# Patient Record
Sex: MALE | Race: OTHER | Employment: FULL TIME | URBAN - METROPOLITAN AREA
[De-identification: names, ages, dates, MRNs, and addresses within clinical notes are randomized per-mention and may not be internally consistent; named-entity substitution may affect disease eponyms.]

---

## 2017-01-17 ENCOUNTER — TELEPHONE (OUTPATIENT)
Dept: FAMILY MEDICINE CLINIC | Facility: CLINIC | Age: 32
End: 2017-01-17

## 2017-01-17 DIAGNOSIS — J30.2 SEASONAL ALLERGIC RHINITIS, UNSPECIFIED ALLERGIC RHINITIS TRIGGER: Primary | ICD-10-CM

## 2017-01-17 RX ORDER — CETIRIZINE HYDROCHLORIDE 10 MG/1
10 TABLET ORAL DAILY
Qty: 30 TABLET | Refills: 2 | Status: SHIPPED | OUTPATIENT
Start: 2017-01-17 | End: 2017-07-13

## 2017-07-13 ENCOUNTER — OFFICE VISIT (OUTPATIENT)
Dept: FAMILY MEDICINE CLINIC | Facility: CLINIC | Age: 32
End: 2017-07-13

## 2017-07-13 VITALS
DIASTOLIC BLOOD PRESSURE: 84 MMHG | HEART RATE: 67 BPM | OXYGEN SATURATION: 100 % | SYSTOLIC BLOOD PRESSURE: 150 MMHG | HEIGHT: 74 IN

## 2017-07-13 DIAGNOSIS — G56.01 CARPAL TUNNEL SYNDROME OF RIGHT WRIST: Primary | ICD-10-CM

## 2017-07-13 DIAGNOSIS — R03.0 ELEVATED BP WITHOUT DIAGNOSIS OF HYPERTENSION: ICD-10-CM

## 2017-07-13 DIAGNOSIS — J30.2 SEASONAL ALLERGIC RHINITIS, UNSPECIFIED ALLERGIC RHINITIS TRIGGER: ICD-10-CM

## 2017-07-13 DIAGNOSIS — M54.50 ACUTE RIGHT-SIDED LOW BACK PAIN WITHOUT SCIATICA: ICD-10-CM

## 2017-07-13 DIAGNOSIS — Z00.00 ROUTINE GENERAL MEDICAL EXAMINATION AT A HEALTH CARE FACILITY: ICD-10-CM

## 2017-07-13 PROCEDURE — 99395 PREV VISIT EST AGE 18-39: CPT | Performed by: FAMILY MEDICINE

## 2017-07-13 RX ORDER — ALBUTEROL SULFATE 90 UG/1
1-2 AEROSOL, METERED RESPIRATORY (INHALATION) EVERY 6 HOURS PRN
Qty: 1 INHALER | Refills: 5 | Status: SHIPPED | OUTPATIENT
Start: 2017-07-13 | End: 2019-09-19

## 2017-07-13 RX ORDER — METHYLPREDNISOLONE 4 MG/1
TABLET ORAL
Qty: 1 KIT | Refills: 1 | Status: SHIPPED | OUTPATIENT
Start: 2017-07-13 | End: 2019-09-19 | Stop reason: ALTCHOICE

## 2017-07-13 RX ORDER — CETIRIZINE HYDROCHLORIDE 10 MG/1
10 TABLET ORAL DAILY
Qty: 30 TABLET | Refills: 2 | Status: SHIPPED | OUTPATIENT
Start: 2017-07-13 | End: 2017-10-07

## 2017-07-13 NOTE — PROGRESS NOTES
CC: Annual Physical Exam    HPI:   lFaco Woodson is a 29year old male who presents for a complete physical exam and pain r hand    Wt Readings from Last 6 Encounters:  10/19/16 : (!) 341 lb  10/12/16 : (!) 360 lb    There is no height or weight on file to 32.0 pg   MCHC 34.3 32.0 - 37.0 g/dl   RDW 12.9 11.0 - 15.0 %    140 - 400 K/UL   MPV 8.1 7.4 - 10.3 fL   Neutrophil % 82 %   Lymphocyte % 12 %   Monocyte % 6 %   Eosinophil % 0 %   Basophil % 0 %   Neutrophil Absolute 4.6 1.8 - 7.7 K/UL   Lymphocyt paralysis, ataxia, numbness or tingling in the extremities,change in bowel or bladder control. HEMATOLOGIC:  Denies anemia, bleeding or bruising. LYMPHATICS:  Denies enlarged nodes or history of splenectomy. PSYCHIATRIC:  Denies depression or anxiety. maintenance, will check: No orders of the defined types were placed in this encounter. 1. Routine general medical examination at a health care facility  Labs in october    2.  Seasonal allergic rhinitis, unspecified allergic rhinitis trigger  Refill me

## 2017-10-07 DIAGNOSIS — J30.2 SEASONAL ALLERGIC RHINITIS: ICD-10-CM

## 2017-10-09 RX ORDER — CETIRIZINE HYDROCHLORIDE 10 MG/1
TABLET ORAL
Qty: 30 TABLET | Refills: 1 | Status: SHIPPED | OUTPATIENT
Start: 2017-10-09

## 2018-03-26 ENCOUNTER — HOSPITAL ENCOUNTER (EMERGENCY)
Facility: HOSPITAL | Age: 33
Discharge: HOME | End: 2018-03-26
Attending: EMERGENCY MEDICINE
Payer: MEDICAID

## 2018-03-26 ENCOUNTER — APPOINTMENT (OUTPATIENT)
Dept: GENERAL RADIOLOGY | Facility: HOSPITAL | Age: 33
End: 2018-03-26
Payer: MEDICAID

## 2018-03-26 VITALS
HEIGHT: 74 IN | HEART RATE: 72 BPM | WEIGHT: 315 LBS | BODY MASS INDEX: 40.43 KG/M2 | DIASTOLIC BLOOD PRESSURE: 70 MMHG | OXYGEN SATURATION: 98 % | TEMPERATURE: 98 F | RESPIRATION RATE: 18 BRPM | SYSTOLIC BLOOD PRESSURE: 136 MMHG

## 2018-03-26 DIAGNOSIS — J06.9 ACUTE UPPER RESPIRATORY INFECTION: Primary | ICD-10-CM

## 2018-03-26 DIAGNOSIS — J40 BRONCHITIS: ICD-10-CM

## 2018-03-26 LAB — S PYO AG THROAT QL: NEGATIVE

## 2018-03-26 PROCEDURE — 71046 X-RAY EXAM CHEST 2 VIEWS: CPT | Performed by: EMERGENCY MEDICINE

## 2018-03-26 PROCEDURE — 87430 STREP A AG IA: CPT

## 2018-03-26 PROCEDURE — 99283 EMERGENCY DEPT VISIT LOW MDM: CPT

## 2018-03-26 RX ORDER — PSEUDOEPHEDRINE HCL 120 MG/1
120 TABLET, FILM COATED, EXTENDED RELEASE ORAL EVERY 12 HOURS PRN
Qty: 10 TABLET | Refills: 0 | Status: SHIPPED | OUTPATIENT
Start: 2018-03-26 | End: 2018-04-25

## 2018-03-26 RX ORDER — ALBUTEROL SULFATE 90 UG/1
2 AEROSOL, METERED RESPIRATORY (INHALATION) EVERY 4 HOURS PRN
Qty: 1 INHALER | Refills: 0 | Status: SHIPPED | OUTPATIENT
Start: 2018-03-26 | End: 2018-04-25

## 2018-03-26 RX ORDER — PREDNISONE 20 MG/1
40 TABLET ORAL DAILY
Qty: 10 TABLET | Refills: 0 | Status: SHIPPED | OUTPATIENT
Start: 2018-03-26 | End: 2018-03-31

## 2018-03-27 NOTE — ED PROVIDER NOTES
Patient Seen in: [facility] AND [facility]    History   Patient presents with:  Cough/URI    Stated Complaint: sore throat, cough, fatigue    HPI    33-year-old male with history of asthma presents for complaint of cough, congestion, sore th Wt (!) 158.8 kg   SpO2 98%   BMI 44.94 kg/m²         Physical Exam   Constitutional: He is oriented to person, place, and time. He appears well-developed and well-nourished. HENT:   Head: Normocephalic and atraumatic.    Right Ear: Tympanic membrane blanka FINDINGS: CARDIAC/VASC: No cardiac silhouette abnormality or cardiomegaly. Unremarkable pulmonary vasculature. MEDIAST/DELROY: No visible mass or adenopathy. LUNGS/PLEURA: No significant pulmonary parenchymal abnormalities.   No effusion or pleural thicke

## 2019-09-09 ENCOUNTER — OFFICE VISIT (OUTPATIENT)
Dept: FAMILY MEDICINE CLINIC | Facility: CLINIC | Age: 34
End: 2019-09-09
Payer: MEDICAID

## 2019-09-09 ENCOUNTER — HOSPITAL ENCOUNTER (OUTPATIENT)
Dept: GENERAL RADIOLOGY | Facility: HOSPITAL | Age: 34
Discharge: HOME | End: 2019-09-09
Attending: FAMILY MEDICINE
Payer: MEDICAID

## 2019-09-09 ENCOUNTER — LAB ENCOUNTER (OUTPATIENT)
Dept: LAB | Facility: HOSPITAL | Age: 34
End: 2019-09-09
Attending: FAMILY MEDICINE
Payer: MEDICAID

## 2019-09-09 VITALS
SYSTOLIC BLOOD PRESSURE: 128 MMHG | TEMPERATURE: 98 F | BODY MASS INDEX: 40.43 KG/M2 | WEIGHT: 315 LBS | HEART RATE: 99 BPM | HEIGHT: 74 IN | DIASTOLIC BLOOD PRESSURE: 83 MMHG

## 2019-09-09 DIAGNOSIS — R19.7 DIARRHEA, UNSPECIFIED TYPE: ICD-10-CM

## 2019-09-09 DIAGNOSIS — E66.01 MORBID OBESITY WITH BMI OF 45.0-49.9, ADULT (HCC): ICD-10-CM

## 2019-09-09 DIAGNOSIS — R10.84 GENERALIZED ABDOMINAL PAIN: Primary | ICD-10-CM

## 2019-09-09 DIAGNOSIS — R10.84 GENERALIZED ABDOMINAL PAIN: ICD-10-CM

## 2019-09-09 LAB
ALBUMIN SERPL-MCNC: 4.2 G/DL (ref 3.4–5)
ALBUMIN/GLOB SERPL: 1 {RATIO} (ref 1–2)
ALP LIVER SERPL-CCNC: 76 U/L (ref 45–117)
ALT SERPL-CCNC: 28 U/L (ref 16–61)
ANION GAP SERPL CALC-SCNC: 5 MMOL/L (ref 0–18)
AST SERPL-CCNC: 24 U/L (ref 15–37)
BASOPHILS # BLD AUTO: 0.03 X10(3) UL (ref 0–0.2)
BASOPHILS NFR BLD AUTO: 0.4 %
BILIRUB SERPL-MCNC: 0.5 MG/DL (ref 0.1–2)
BUN BLD-MCNC: 10 MG/DL (ref 7–18)
BUN/CREAT SERPL: 9.5 (ref 10–20)
CALCIUM BLD-MCNC: 9.5 MG/DL (ref 8.5–10.1)
CHLORIDE SERPL-SCNC: 105 MMOL/L (ref 98–112)
CO2 SERPL-SCNC: 28 MMOL/L (ref 21–32)
CREAT BLD-MCNC: 1.05 MG/DL (ref 0.7–1.3)
DEPRECATED RDW RBC AUTO: 38.7 FL (ref 35.1–46.3)
EOSINOPHIL # BLD AUTO: 0.17 X10(3) UL (ref 0–0.7)
EOSINOPHIL NFR BLD AUTO: 2.1 %
ERYTHROCYTE [DISTWIDTH] IN BLOOD BY AUTOMATED COUNT: 12.1 % (ref 11–15)
GLOBULIN PLAS-MCNC: 4.1 G/DL (ref 2.8–4.4)
GLUCOSE BLD-MCNC: 96 MG/DL (ref 70–99)
HCT VFR BLD AUTO: 45.3 % (ref 39–53)
HGB BLD-MCNC: 14.9 G/DL (ref 13–17.5)
IMM GRANULOCYTES # BLD AUTO: 0.03 X10(3) UL (ref 0–1)
IMM GRANULOCYTES NFR BLD: 0.4 %
LYMPHOCYTES # BLD AUTO: 2.7 X10(3) UL (ref 1–4)
LYMPHOCYTES NFR BLD AUTO: 33.8 %
M PROTEIN MFR SERPL ELPH: 8.3 G/DL (ref 6.4–8.2)
MCH RBC QN AUTO: 28.7 PG (ref 26–34)
MCHC RBC AUTO-ENTMCNC: 32.9 G/DL (ref 31–37)
MCV RBC AUTO: 87.1 FL (ref 80–100)
MONOCYTES # BLD AUTO: 0.86 X10(3) UL (ref 0.1–1)
MONOCYTES NFR BLD AUTO: 10.8 %
NEUTROPHILS # BLD AUTO: 4.19 X10 (3) UL (ref 1.5–7.7)
NEUTROPHILS # BLD AUTO: 4.19 X10(3) UL (ref 1.5–7.7)
NEUTROPHILS NFR BLD AUTO: 52.5 %
OSMOLALITY SERPL CALC.SUM OF ELEC: 285 MOSM/KG (ref 275–295)
PATIENT FASTING: YES
PLATELET # BLD AUTO: 286 10(3)UL (ref 150–450)
POTASSIUM SERPL-SCNC: 4.5 MMOL/L (ref 3.5–5.1)
RBC # BLD AUTO: 5.2 X10(6)UL (ref 4.3–5.7)
SODIUM SERPL-SCNC: 138 MMOL/L (ref 136–145)
WBC # BLD AUTO: 8 X10(3) UL (ref 4–11)

## 2019-09-09 PROCEDURE — 99203 OFFICE O/P NEW LOW 30 MIN: CPT | Performed by: FAMILY MEDICINE

## 2019-09-09 PROCEDURE — 80053 COMPREHEN METABOLIC PANEL: CPT

## 2019-09-09 PROCEDURE — 74018 RADEX ABDOMEN 1 VIEW: CPT | Performed by: FAMILY MEDICINE

## 2019-09-09 PROCEDURE — 85025 COMPLETE CBC W/AUTO DIFF WBC: CPT

## 2019-09-09 PROCEDURE — 36415 COLL VENOUS BLD VENIPUNCTURE: CPT

## 2019-09-09 NOTE — PATIENT INSTRUCTIONS
Tratamiento de la diarrea    La diarrea consiste en evacuaciones más frecuentes o más flojas de lo usual. Es un problema común que puede tener distintas causas y, por lo general, se kelsey solo en pocos días.  Sin embargo, algunos casos pueden necesitar tra · Síntomas de deshidratación (mareos, sequedad en la boca y la [place], pulso acelerado, Leilani Haddad)  Date Last Reviewed: 7/1/2016  © 8506-3459 [employer]. [address] [facility], [address] [place]. Todos los derechos reservados.  Esta infor

## 2019-09-09 NOTE — PROGRESS NOTES
HPI:    Patient ID: Memo Ocampo is a 30year old male.     HPI  Patient presents with:  Abdominal Pain: started Sunday ate 3 tacos around 2:00 and then a Hamburger from [address]   [address] around 6:00 having diarrhea since   Medication Request  Medication Request: breath sounds normal.   Abdominal: Soft. Bowel sounds are normal. He exhibits no mass. There is generalized tenderness. There is no rebound, no guarding and no CVA tenderness.    Generalized tenderness especially in the right to mid epigastric region as wel

## 2019-09-19 ENCOUNTER — OFFICE VISIT (OUTPATIENT)
Dept: FAMILY MEDICINE CLINIC | Facility: CLINIC | Age: 34
End: 2019-09-19
Payer: MEDICAID

## 2019-09-19 VITALS
WEIGHT: 315 LBS | SYSTOLIC BLOOD PRESSURE: 134 MMHG | DIASTOLIC BLOOD PRESSURE: 87 MMHG | TEMPERATURE: 98 F | BODY MASS INDEX: 47 KG/M2 | HEART RATE: 77 BPM

## 2019-09-19 DIAGNOSIS — J30.2 SEASONAL ALLERGIC RHINITIS: ICD-10-CM

## 2019-09-19 DIAGNOSIS — K52.9 GASTROENTERITIS: Primary | ICD-10-CM

## 2019-09-19 DIAGNOSIS — R19.7 DIARRHEA, UNSPECIFIED TYPE: ICD-10-CM

## 2019-09-19 PROCEDURE — 99213 OFFICE O/P EST LOW 20 MIN: CPT | Performed by: FAMILY MEDICINE

## 2019-09-19 RX ORDER — ALBUTEROL SULFATE 90 UG/1
1-2 AEROSOL, METERED RESPIRATORY (INHALATION) EVERY 6 HOURS PRN
Qty: 1 INHALER | Refills: 0 | Status: SHIPPED | OUTPATIENT
Start: 2019-09-19 | End: 2020-03-16

## 2019-09-19 RX ORDER — OMEPRAZOLE 40 MG/1
40 CAPSULE, DELAYED RELEASE ORAL DAILY
Qty: 14 CAPSULE | Refills: 0 | Status: SHIPPED | OUTPATIENT
Start: 2019-09-19 | End: 2020-06-03

## 2019-09-19 RX ORDER — DICYCLOMINE HYDROCHLORIDE 10 MG/1
10 CAPSULE ORAL 3 TIMES DAILY
Qty: 21 CAPSULE | Refills: 0 | Status: SHIPPED | OUTPATIENT
Start: 2019-09-19 | End: 2019-09-29

## 2019-09-19 NOTE — PROGRESS NOTES
HPI:    Patient ID: Siomara Gonsalez is a 30year old male. HPI  Patient presents with:  Diarrhea: c/o diarrhea for 2 weeks, pt saw DR. Pisano last week and still not feeling better  more control of the diarrhea but not completely resolved.   One and half wee

## 2020-03-16 DIAGNOSIS — J30.2 SEASONAL ALLERGIC RHINITIS: ICD-10-CM

## 2020-03-16 RX ORDER — ALBUTEROL SULFATE 90 UG/1
1-2 AEROSOL, METERED RESPIRATORY (INHALATION) EVERY 6 HOURS PRN
Qty: 18 G | Refills: 0 | Status: SHIPPED | OUTPATIENT
Start: 2020-03-16 | End: 2020-11-30

## 2020-03-16 NOTE — TELEPHONE ENCOUNTER
Refill noted. Chart reviewed. Okay to fill times one with no additional refills. Refilled on behalf of Dr. Luis Daniel Hunter.

## 2020-03-27 ENCOUNTER — NURSE TRIAGE (OUTPATIENT)
Dept: FAMILY MEDICINE CLINIC | Facility: CLINIC | Age: 35
End: 2020-03-27

## 2020-03-27 DIAGNOSIS — J01.00 ACUTE NON-RECURRENT MAXILLARY SINUSITIS: ICD-10-CM

## 2020-03-27 PROCEDURE — 99212 OFFICE O/P EST SF 10 MIN: CPT | Performed by: FAMILY MEDICINE

## 2020-03-27 NOTE — TELEPHONE ENCOUNTER
Action Requested: Summary for Provider     []  Critical Lab, Recommendations Needed  [] Need Additional Advice  []   FYI    []   Need Orders  [] Need Medications Sent to Pharmacy  []  Other     SUMMARY:  Patient states for two days having a cough, runny no

## 2020-03-27 NOTE — TELEPHONE ENCOUNTER
TELEPHONE VISIT PROGRESS NOTE  Todays date: 3/27/2020 2:22 PM      Most recent Nurse Triage message/ Mychart message from patient:     C/o cough and cold symptoms for two days.      Due to the COVID-19 emergency implementation plan, this patient's incoming health and safety measures including washing hands, social distancing, covering mouth when coughing/ sneezing, avoid social meetings/ gatherings in face of this Covid 19 pandemic.     Patient verbalized understanding of plan and all questions answered to th

## 2020-05-13 ENCOUNTER — OFFICE VISIT (OUTPATIENT)
Dept: FAMILY MEDICINE CLINIC | Facility: CLINIC | Age: 35
End: 2020-05-13
Payer: MEDICAID

## 2020-05-13 VITALS
RESPIRATION RATE: 18 BRPM | SYSTOLIC BLOOD PRESSURE: 136 MMHG | TEMPERATURE: 98 F | DIASTOLIC BLOOD PRESSURE: 85 MMHG | HEIGHT: 74 IN | HEART RATE: 82 BPM | WEIGHT: 271 LBS | BODY MASS INDEX: 34.78 KG/M2

## 2020-05-13 DIAGNOSIS — M54.50 DORSALGIA OF LUMBAR REGION: ICD-10-CM

## 2020-05-13 PROCEDURE — 99213 OFFICE O/P EST LOW 20 MIN: CPT | Performed by: FAMILY MEDICINE

## 2020-05-13 RX ORDER — HYDROCODONE BITARTRATE AND ACETAMINOPHEN 10; 325 MG/1; MG/1
1 TABLET ORAL EVERY 6 HOURS PRN
Qty: 45 TABLET | Refills: 0 | Status: SHIPPED | OUTPATIENT
Start: 2020-05-13 | End: 2020-06-01

## 2020-05-13 NOTE — PROGRESS NOTES
HPI:    Patient ID: Gino Trujillo is a 30year old male. Pt presents with pain of his lower back after lifting bricks at work at noon today. Pt states he has pain trying to bend and hard to sit. Pain radiates to lower back and upper legs.  No numbness or encounter. Meds This Visit:  Requested Prescriptions     Signed Prescriptions Disp Refills   • HYDROcodone-acetaminophen (NORCO)  MG Oral Tab 45 tablet 0     Sig: Take 1 tablet by mouth every 6 (six) hours as needed for Pain.  Medication may caus

## 2020-05-21 ENCOUNTER — OFFICE VISIT (OUTPATIENT)
Dept: FAMILY MEDICINE CLINIC | Facility: CLINIC | Age: 35
End: 2020-05-21
Payer: MEDICAID

## 2020-05-21 VITALS
RESPIRATION RATE: 18 BRPM | DIASTOLIC BLOOD PRESSURE: 90 MMHG | HEART RATE: 94 BPM | TEMPERATURE: 99 F | WEIGHT: 315 LBS | HEIGHT: 74 IN | BODY MASS INDEX: 40.43 KG/M2 | SYSTOLIC BLOOD PRESSURE: 141 MMHG

## 2020-05-21 DIAGNOSIS — S39.012D STRAIN OF LUMBAR REGION, SUBSEQUENT ENCOUNTER: ICD-10-CM

## 2020-05-21 PROCEDURE — 3008F BODY MASS INDEX DOCD: CPT | Performed by: FAMILY MEDICINE

## 2020-05-21 PROCEDURE — 3080F DIAST BP >= 90 MM HG: CPT | Performed by: FAMILY MEDICINE

## 2020-05-21 PROCEDURE — 99213 OFFICE O/P EST LOW 20 MIN: CPT | Performed by: FAMILY MEDICINE

## 2020-05-21 PROCEDURE — 3077F SYST BP >= 140 MM HG: CPT | Performed by: FAMILY MEDICINE

## 2020-05-21 RX ORDER — CYCLOBENZAPRINE HCL 10 MG
10 TABLET ORAL NIGHTLY
Qty: 15 TABLET | Refills: 0 | Status: SHIPPED | OUTPATIENT
Start: 2020-05-21 | End: 2020-07-14

## 2020-05-21 NOTE — PROGRESS NOTES
HPI:    Patient ID: Frederic Aceves is a 30year old male. Pt presents for follow up for his back today. Pt states he now has sig tightness of his lower back. Pain is better with norco but ptt had had some tingling of legs/ buttocks now.       Review of Sy provided        No orders of the defined types were placed in this encounter.       Meds This Visit:  Requested Prescriptions     Signed Prescriptions Disp Refills   • cyclobenzaprine 10 MG Oral Tab 15 tablet 0     Sig: Take 1 tablet (10 mg total) by mouth

## 2020-06-01 ENCOUNTER — TELEPHONE (OUTPATIENT)
Dept: FAMILY MEDICINE CLINIC | Facility: CLINIC | Age: 35
End: 2020-06-01

## 2020-06-01 RX ORDER — HYDROCODONE BITARTRATE AND ACETAMINOPHEN 10; 325 MG/1; MG/1
1 TABLET ORAL EVERY 6 HOURS PRN
Qty: 45 TABLET | Refills: 0 | Status: SHIPPED | OUTPATIENT
Start: 2020-06-01 | End: 2020-07-14

## 2020-06-01 NOTE — TELEPHONE ENCOUNTER
Per patient he needs refill on his HYDROcodone-acetaminophen [facility]) per patient he is out of medication.     Current Outpatient Medications   Medication Sig Dispense Refill   •   15 tablet 0   • HYDROcodone-acetaminophen (NORCO)  MG Oral Tab Take 1 t

## 2020-06-01 NOTE — TELEPHONE ENCOUNTER
Message noted: Chart reviewed and may refill norco as requested times one. Script sent to listed pharmacy by secure method. [place]. No concerns noted. Please notify patient.

## 2020-06-03 ENCOUNTER — HOSPITAL ENCOUNTER (OUTPATIENT)
Dept: GENERAL RADIOLOGY | Facility: HOSPITAL | Age: 35
Discharge: HOME | End: 2020-06-03
Attending: PHYSICAL MEDICINE & REHABILITATION
Payer: MEDICAID

## 2020-06-03 ENCOUNTER — OFFICE VISIT (OUTPATIENT)
Dept: NEUROLOGY | Facility: CLINIC | Age: 35
End: 2020-06-03
Payer: MEDICAID

## 2020-06-03 ENCOUNTER — TELEPHONE (OUTPATIENT)
Dept: NEUROLOGY | Facility: CLINIC | Age: 35
End: 2020-06-03

## 2020-06-03 VITALS — HEIGHT: 74 IN | WEIGHT: 315 LBS | BODY MASS INDEX: 40.43 KG/M2

## 2020-06-03 DIAGNOSIS — M48.061 LUMBAR FORAMINAL STENOSIS: ICD-10-CM

## 2020-06-03 DIAGNOSIS — E66.01 CLASS 3 SEVERE OBESITY DUE TO EXCESS CALORIES WITH SERIOUS COMORBIDITY AND BODY MASS INDEX (BMI) OF 45.0 TO 49.9 IN ADULT (HCC): ICD-10-CM

## 2020-06-03 DIAGNOSIS — M54.59 MECHANICAL LOW BACK PAIN: ICD-10-CM

## 2020-06-03 DIAGNOSIS — M54.16 ACUTE LUMBAR RADICULOPATHY: ICD-10-CM

## 2020-06-03 DIAGNOSIS — M79.10 MYALGIA: ICD-10-CM

## 2020-06-03 DIAGNOSIS — M54.16 ACUTE LUMBAR RADICULOPATHY: Primary | ICD-10-CM

## 2020-06-03 PROCEDURE — 99244 OFF/OP CNSLTJ NEW/EST MOD 40: CPT | Performed by: PHYSICAL MEDICINE & REHABILITATION

## 2020-06-03 PROCEDURE — 72110 X-RAY EXAM L-2 SPINE 4/>VWS: CPT | Performed by: PHYSICAL MEDICINE & REHABILITATION

## 2020-06-03 RX ORDER — METHYLPREDNISOLONE 4 MG/1
TABLET ORAL
Qty: 2 PACKAGE | Refills: 0 | Status: SHIPPED | OUTPATIENT
Start: 2020-06-03 | End: 2020-07-14

## 2020-06-03 RX ORDER — CYCLOBENZAPRINE HCL 5 MG
TABLET ORAL
Qty: 90 TABLET | Refills: 0 | Status: SHIPPED | OUTPATIENT
Start: 2020-06-03 | End: 2020-07-20

## 2020-06-03 NOTE — PATIENT INSTRUCTIONS
1) I spoke with the patient and instructed them to take the double dose of the medrol dose pack as follows:  Take all of the day 1 tablets for both packages together in the morning, Take all of the day 2 tablets for both packages together in the morning on

## 2020-06-03 NOTE — H&P
[address] H&P    Requesting Physician: Nestor Goodpasture, MD    Chief Complaint (Reason for Visit):  Patient presents with:  Low Back Pain: New pt presents for Right sided LBP that started after putting Dispense Refill   • methylPREDNISolone (MEDROL) 4 MG Oral Tablet Therapy Pack As directed 2 Package 0   • cyclobenzaprine 5 MG Oral Tab 5 mg 1-2 tablets three times per day as needed for spasms.  Do not operate heavy machinery while on this medication as it commands, comprehention intact, spontaneous speech intact  Motor:    Musculoskeletal:    LUMBAR SPINE:  Inspection: no erythema, swelling, or obvious deformity.   Their iliac crest and shoulder heights are symmetrical.  Postural exam reveals no scoliosis or 28  16 - 61 U/L Final   • AST 09/09/2019 24  15 - 37 U/L Final   • Alkaline Phosphatase 09/09/2019 76  45 - 117 U/L Final   • Bilirubin, Total 09/09/2019 0.5  0.1 - 2.0 mg/dL Final   • Total Protein 09/09/2019 8.3* 6.4 - 8.2 g/dL Final   • Albumin 09/09/20 lumbar region without neurogenic claudication. Low back paiin when bending his back. TECHNIQUE: Lumbar spine radiographs (AP , neutral lateral and lateral flexion extension upright views)       FINDINGS:      ALIGNMENT:   Normal alignment.     VERTEBRA questions were answered. There were no barriers to learning.          Acute lumbar radiculopathy  (primary encounter diagnosis)  Class 3 severe obesity due to excess calories with serious comorbidity and body mass index (BMI) of 45.0 to 49.9 in adult [facility])

## 2020-06-03 NOTE — TELEPHONE ENCOUNTER
[employer] for authorization of approval for MRI L-spine wo cpt code 00020. Approval was given with Authorization Number: [id number] effective 06/03/20 to 11/30/20. Will call Pt. To inform. Pt.  Informed of approval. Transferred call to scheduling for

## 2020-06-04 ENCOUNTER — HOSPITAL ENCOUNTER (OUTPATIENT)
Dept: MRI IMAGING | Facility: HOSPITAL | Age: 35
Discharge: HOME | End: 2020-06-04
Attending: PHYSICAL MEDICINE & REHABILITATION
Payer: MEDICAID

## 2020-06-04 DIAGNOSIS — M54.16 ACUTE LUMBAR RADICULOPATHY: ICD-10-CM

## 2020-06-04 DIAGNOSIS — E66.01 CLASS 3 SEVERE OBESITY DUE TO EXCESS CALORIES WITH SERIOUS COMORBIDITY AND BODY MASS INDEX (BMI) OF 45.0 TO 49.9 IN ADULT (HCC): ICD-10-CM

## 2020-06-04 DIAGNOSIS — M79.10 MYALGIA: ICD-10-CM

## 2020-06-04 DIAGNOSIS — M48.061 LUMBAR FORAMINAL STENOSIS: ICD-10-CM

## 2020-06-04 DIAGNOSIS — M54.59 MECHANICAL LOW BACK PAIN: ICD-10-CM

## 2020-06-04 PROCEDURE — 72148 MRI LUMBAR SPINE W/O DYE: CPT | Performed by: PHYSICAL MEDICINE & REHABILITATION

## 2020-06-08 ENCOUNTER — TELEPHONE (OUTPATIENT)
Dept: NEUROLOGY | Facility: CLINIC | Age: 35
End: 2020-06-08

## 2020-06-09 ENCOUNTER — OFFICE VISIT (OUTPATIENT)
Dept: PHYSICAL THERAPY | Facility: HOSPITAL | Age: 35
End: 2020-06-09
Attending: PHYSICAL MEDICINE & REHABILITATION
Payer: MEDICAID

## 2020-06-09 DIAGNOSIS — M79.10 MYALGIA: ICD-10-CM

## 2020-06-09 DIAGNOSIS — E66.01 CLASS 3 SEVERE OBESITY DUE TO EXCESS CALORIES WITH SERIOUS COMORBIDITY AND BODY MASS INDEX (BMI) OF 45.0 TO 49.9 IN ADULT (HCC): ICD-10-CM

## 2020-06-09 DIAGNOSIS — M54.59 MECHANICAL LOW BACK PAIN: ICD-10-CM

## 2020-06-09 DIAGNOSIS — M54.16 ACUTE LUMBAR RADICULOPATHY: ICD-10-CM

## 2020-06-09 DIAGNOSIS — M48.061 LUMBAR FORAMINAL STENOSIS: ICD-10-CM

## 2020-06-09 PROCEDURE — 97110 THERAPEUTIC EXERCISES: CPT

## 2020-06-09 PROCEDURE — 97162 PT EVAL MOD COMPLEX 30 MIN: CPT

## 2020-06-09 NOTE — PROGRESS NOTES
LUMBAR SPINE EVALUATION:   Referring Physician: Dr. Denice Jimenez  Diagnosis: Class 3 severe obesity due to excess calories with serious comorbidity and body mass index (BMI) of 45.0 to 49.9 in adult [facility]) (A85.42,G69.63)  Myalgia (M79.10)  Mechanical low back mins, driving for 30 mins but uncomfortable, sit to stand, putting on socks/shoes, lifting amount of weight from the ground, standing in 1 place.    Disturbed Sleep: No because of pain med use     Past Medical History:   Diagnosis Date   • Asthma    Obesity function    Precautions:  None    OBJECTIVE:   Observation/Posture:  Lordosis: Decreased  Lateral Shift: None noted.  L rotation in standing (able to correct with verbal cues)  Correction of Posture: Better    Gait Deviations: Slow gait speed    AROM Major rationale and outcome measures, this evaluation involved Moderate Complexity decision making due to 3+ personal factors/comorbidities, 3 body structures involved/activity limitations, and evolving symptoms including changing pain levels.     PLAN OF CARE:

## 2020-06-11 ENCOUNTER — OFFICE VISIT (OUTPATIENT)
Dept: PHYSICAL THERAPY | Facility: HOSPITAL | Age: 35
End: 2020-06-11
Attending: PHYSICAL MEDICINE & REHABILITATION
Payer: MEDICAID

## 2020-06-11 ENCOUNTER — TELEMEDICINE (OUTPATIENT)
Dept: NEUROLOGY | Facility: CLINIC | Age: 35
End: 2020-06-11

## 2020-06-11 DIAGNOSIS — M54.16 LUMBAR RADICULOPATHY, ACUTE: ICD-10-CM

## 2020-06-11 DIAGNOSIS — M48.061 LUMBAR FORAMINAL STENOSIS: ICD-10-CM

## 2020-06-11 DIAGNOSIS — E66.01 CLASS 3 SEVERE OBESITY DUE TO EXCESS CALORIES WITH SERIOUS COMORBIDITY AND BODY MASS INDEX (BMI) OF 45.0 TO 49.9 IN ADULT (HCC): Primary | ICD-10-CM

## 2020-06-11 DIAGNOSIS — M51.16 LUMBAR DISC HERNIATION WITH RADICULOPATHY: ICD-10-CM

## 2020-06-11 DIAGNOSIS — M54.16 ACUTE LUMBAR RADICULOPATHY: ICD-10-CM

## 2020-06-11 DIAGNOSIS — M79.10 MYALGIA: ICD-10-CM

## 2020-06-11 DIAGNOSIS — M51.37 DDD (DEGENERATIVE DISC DISEASE), LUMBOSACRAL: ICD-10-CM

## 2020-06-11 DIAGNOSIS — M54.59 MECHANICAL LOW BACK PAIN: ICD-10-CM

## 2020-06-11 PROCEDURE — 97140 MANUAL THERAPY 1/> REGIONS: CPT

## 2020-06-11 PROCEDURE — 99213 OFFICE O/P EST LOW 20 MIN: CPT | Performed by: PHYSICAL MEDICINE & REHABILITATION

## 2020-06-11 PROCEDURE — 97110 THERAPEUTIC EXERCISES: CPT

## 2020-06-11 NOTE — PROGRESS NOTES
Dx: Class 3 severe obesity due to excess calories with serious comorbidity and body mass index (BMI) of 45.0 to 49.9 in adult [facility]) (U74.68,U09.40)  Myalgia (M79.10)  Mechanical low back pain (M54.5)  Lumbar foraminal stenosis (M48.061)  Acute lumbar radic day.    Pt reports improvement in R hamstring symptoms post repeated ext in lying. Pt reports that this returns to baseline post completion. Pt with positive R sided neural tension signs today with high levels of radicular pain with celestine Orr in supine.

## 2020-06-11 NOTE — PROGRESS NOTES
[address]  Video Visit Progress Note    Paulo Alicia. verbally consents to a Telemedicine Visit on 06/11/20. This visit is conducted using Telemedicine with live, interactive audio and video.     Patient needed for spasms. Do not operate heavy machinery while on this medication as it may make you sleepy 90 tablet 0   • HYDROcodone-acetaminophen (NORCO)  MG Oral Tab Take 1 tablet by mouth every 6 (six) hours as needed for Pain.  Medication may causes s Ref Range Status   • Glucose 09/09/2019 96  70 - 99 mg/dL Final   • Sodium 09/09/2019 138  136 - 145 mmol/L Final   • Potassium 09/09/2019 4.5  3.5 - 5.1 mmol/L Final   • Chloride 09/09/2019 105  98 - 112 mmol/L Final   • CO2 09/09/2019 28.0  21.0 - 32.0 m - 0.70 x10(3) uL Final   • Basophil Absolute 09/09/2019 0.03  0.00 - 0.20 x10(3) uL Final   • Immature Granulocyte Absolute 09/09/2019 0.03  0.00 - 1.00 x10(3) uL Final   • Neutrophil % 09/09/2019 52.5  % Final   • Lymphocyte % 09/09/2019 33.8  % Final   • or neural foraminal compromise; likely degenerative fluid within the facet joints. L5-S1: Right eccentric disc bulge with mild right greater than left facet arthropathy.   Mild right neural foraminal stenosis without additional significant neural compromis interest to socially distance his/herself. Given this, we are not recommending any elective procedures or office visits at the outpatient surgery center or in the office respectively unless deemed necessary.   My staff will be reaching out to the patient fo

## 2020-06-11 NOTE — PATIENT INSTRUCTIONS
1) Continue with physical therapy and muscle relaxer  2) Follow up with me in 2 weeks.  If no improvement with PT, then we will order RIGHT L4 and RIGHT L5 TFESI

## 2020-06-16 ENCOUNTER — OFFICE VISIT (OUTPATIENT)
Dept: PHYSICAL THERAPY | Facility: HOSPITAL | Age: 35
End: 2020-06-16
Attending: PHYSICAL MEDICINE & REHABILITATION
Payer: MEDICAID

## 2020-06-16 PROCEDURE — 97110 THERAPEUTIC EXERCISES: CPT

## 2020-06-16 PROCEDURE — 97140 MANUAL THERAPY 1/> REGIONS: CPT

## 2020-06-16 NOTE — PROGRESS NOTES
Dx: Class 3 severe obesity due to excess calories with serious comorbidity and body mass index (BMI) of 45.0 to 49.9 in adult [facility]) (Y52.82,X79.85)  Myalgia (M79.10)  Mechanical low back pain (M54.5)  Lumbar foraminal stenosis (M48.061)  Acute lumbar radic over the weekend including waxing the rosales and roof of his car. Pt advised to avoid positions that require prolonged forward flexion due to high probability of increasing his low back pain.  Pt educated to continue to be as active as possible with minimizin

## 2020-06-18 ENCOUNTER — OFFICE VISIT (OUTPATIENT)
Dept: PHYSICAL THERAPY | Facility: HOSPITAL | Age: 35
End: 2020-06-18
Attending: PHYSICAL MEDICINE & REHABILITATION
Payer: MEDICAID

## 2020-06-18 PROCEDURE — 97110 THERAPEUTIC EXERCISES: CPT

## 2020-06-18 NOTE — PROGRESS NOTES
Dx: Class 3 severe obesity due to excess calories with serious comorbidity and body mass index (BMI) of 45.0 to 49.9 in adult [facility]) (P20.87,O36.92)  Myalgia (M79.10)  Mechanical low back pain (M54.5)  Lumbar foraminal stenosis (M48.061)  Acute lumbar radic performance. 2. Sahra Mcclelland. will report he is able to lift 50# at work from the floor to his waist with 2/10 pain or less to demonstrate progress towards returning to work.   3. Sahra Mcclelland. will report he is able to dress self including puttin

## 2020-06-23 ENCOUNTER — OFFICE VISIT (OUTPATIENT)
Dept: PHYSICAL THERAPY | Facility: HOSPITAL | Age: 35
End: 2020-06-23
Attending: PHYSICAL MEDICINE & REHABILITATION
Payer: MEDICAID

## 2020-06-23 ENCOUNTER — TELEPHONE (OUTPATIENT)
Dept: NEUROLOGY | Facility: CLINIC | Age: 35
End: 2020-06-23

## 2020-06-23 PROCEDURE — 97140 MANUAL THERAPY 1/> REGIONS: CPT

## 2020-06-23 PROCEDURE — 97110 THERAPEUTIC EXERCISES: CPT

## 2020-06-23 NOTE — PROGRESS NOTES
Dx: Class 3 severe obesity due to excess calories with serious comorbidity and body mass index (BMI) of 45.0 to 49.9 in adult [facility]) (I53.56,V71.91)  Myalgia (M79.10)  Mechanical low back pain (M54.5)  Lumbar foraminal stenosis (M48.061)  Acute lumbar radic demonstrates high degree of muscle spasm/guarding from CT junction superiorly to T4. Pt with tenderness and restriction worst at T6. Attempted grade 5 mobilization and was unable to achieve cavitation.  Wanda Coe. continues to be educated in [facility]

## 2020-06-23 NOTE — TELEPHONE ENCOUNTER
Medication request: Norco 10-325mg q6h prn pain    LVV 6/11/20  NOV 6/25/20    [place]: 6[place]1/20 #45 prescribed by Dr. Adela Orellana has never filled/prescribed Norco to patient-due to this no medication has been set up

## 2020-06-24 ENCOUNTER — OFFICE VISIT (OUTPATIENT)
Dept: PHYSICAL THERAPY | Facility: HOSPITAL | Age: 35
End: 2020-06-24
Attending: FAMILY MEDICINE
Payer: MEDICAID

## 2020-06-24 PROCEDURE — 97110 THERAPEUTIC EXERCISES: CPT

## 2020-06-24 PROCEDURE — 97140 MANUAL THERAPY 1/> REGIONS: CPT

## 2020-06-24 NOTE — PROGRESS NOTES
Dx: Class 3 severe obesity due to excess calories with serious comorbidity and body mass index (BMI) of 45.0 to 49.9 in adult [facility]) (S94.00,B59.46)  Myalgia (M79.10)  Mechanical low back pain (M54.5)  Lumbar foraminal stenosis (M48.061)  Acute lumbar radic 2. 2x20 (B)   1. 2x20  3. 2x20     Standing Hip  1. ABD  2.  EXT 1-2 2x15 1-2 2x15 1-2 2x15                                               Neuro Re-education:                                        Modalities:              Home Exercise Program    SCIATIC NE Bryanna Tyler. is likely to continue to make gains with skilled PT interventions with focusing on his altered RLE neural tension and resolving his radicular symptom. Bryanna Tyler. to follow up with Dr. Olena Baker on 6/25/2020. Goals:  To be met in 9

## 2020-06-25 ENCOUNTER — APPOINTMENT (OUTPATIENT)
Dept: PHYSICAL THERAPY | Facility: HOSPITAL | Age: 35
End: 2020-06-25
Attending: PHYSICAL MEDICINE & REHABILITATION
Payer: MEDICAID

## 2020-06-25 ENCOUNTER — TELEMEDICINE (OUTPATIENT)
Dept: NEUROLOGY | Facility: CLINIC | Age: 35
End: 2020-06-25

## 2020-06-25 DIAGNOSIS — M79.10 MYALGIA: ICD-10-CM

## 2020-06-25 DIAGNOSIS — M54.16 LUMBAR RADICULOPATHY, ACUTE: ICD-10-CM

## 2020-06-25 DIAGNOSIS — M51.16 LUMBAR DISC HERNIATION WITH RADICULOPATHY: ICD-10-CM

## 2020-06-25 DIAGNOSIS — E66.01 CLASS 3 SEVERE OBESITY DUE TO EXCESS CALORIES WITH SERIOUS COMORBIDITY AND BODY MASS INDEX (BMI) OF 45.0 TO 49.9 IN ADULT (HCC): Primary | ICD-10-CM

## 2020-06-25 DIAGNOSIS — M54.16 ACUTE LUMBAR RADICULOPATHY: ICD-10-CM

## 2020-06-25 DIAGNOSIS — M54.59 MECHANICAL LOW BACK PAIN: ICD-10-CM

## 2020-06-25 DIAGNOSIS — M51.37 DDD (DEGENERATIVE DISC DISEASE), LUMBOSACRAL: ICD-10-CM

## 2020-06-25 DIAGNOSIS — M48.061 LUMBAR FORAMINAL STENOSIS: ICD-10-CM

## 2020-06-25 PROCEDURE — 99214 OFFICE O/P EST MOD 30 MIN: CPT | Performed by: PHYSICAL MEDICINE & REHABILITATION

## 2020-06-25 NOTE — PROGRESS NOTES
[address]  Video Visit Progress Note    Chanelle Perez. verbally consents to a Telemedicine Visit on 06/25/20. This visit is conducted using Telemedicine with live, interactive audio and video.     Patient (six) hours as needed for Pain. 60 tablet 0   • methylPREDNISolone (MEDROL) 4 MG Oral Tablet Therapy Pack As directed 2 Package 0   • cyclobenzaprine 5 MG Oral Tab 5 mg 1-2 tablets three times per day as needed for spasms.  Do not operate heavy machinery wh appropriate    Musculoskeletal Exam:    Gait: Antalgic    Data  No visits with results within 6 Month(s) from this visit.    Latest known visit with results is:   [facility] Lab Encounter on 09/09/2019   Component Date Value Ref Range Status   • Glucose 09/09/2019 09/09/2019 4.19  1.50 - 7.70 x10(3) uL Final   • Lymphocyte Absolute 09/09/2019 2.70  1.00 - 4.00 x10(3) uL Final   • Monocyte Absolute 09/09/2019 0.86  0.10 - 1.00 x10(3) uL Final   • Eosinophil Absolute 09/09/2019 0.17  0.00 - 0.70 x10(3) uL Final   • Ba disc/facet abnormality, spinal stenosis, or foraminal stenosis. L4-L5: Disc desiccation and degeneration with a small central/right paracentral disc protrusion.   No evidence of significant resultant spinal canal, lateral recess, or neural foraminal comp that NSAIDs may mask or worsen COVID-19 infection symptoms. The patient was also informed that corticosteroids, in any form, may significantly decrease immune response and may increase risk and complications of infection.     The patient was advised that g

## 2020-06-25 NOTE — PATIENT INSTRUCTIONS
1) Continue with physical therapy  2) Stop taking Norco and start tramadol 50 mg every 6 hours as needed for pain. 3) Tylenol 500-1000 mg every 6-8 hours as needed for pain. No more than 3000 mg daily.   4) Work restrictions: Sit down duties until July 1

## 2020-06-26 ENCOUNTER — TELEPHONE (OUTPATIENT)
Dept: NEUROLOGY | Facility: CLINIC | Age: 35
End: 2020-06-26

## 2020-06-26 RX ORDER — TRAMADOL HYDROCHLORIDE 50 MG/1
50 TABLET ORAL EVERY 6 HOURS PRN
Qty: 60 TABLET | Refills: 0 | Status: SHIPPED | OUTPATIENT
Start: 2020-06-26 | End: 2020-07-20

## 2020-06-26 NOTE — TELEPHONE ENCOUNTER
Spoke to patient. He states that he does not feel ready to return to work Monday. He feels he needs a few more sessions of PT and would like to return to work the following Monday on 7/6 with restrictions. Please advise if letter can be revised.

## 2020-06-29 NOTE — TELEPHONE ENCOUNTER
New letter written and available through [email or web address]. Please let patient know. Ariana Licea.  Maya Mabry MD, [address] [facility]  Physical Medicine and Rehabilitation/Sports Medicine  MEDICAL CENTER [facility]

## 2020-06-29 NOTE — TELEPHONE ENCOUNTER
Spoke to patient and notified him of below. He was understanding and thankful for call. Transferred patient to  to schedule appointment.

## 2020-06-30 ENCOUNTER — OFFICE VISIT (OUTPATIENT)
Dept: PHYSICAL THERAPY | Facility: HOSPITAL | Age: 35
End: 2020-06-30
Attending: PHYSICAL MEDICINE & REHABILITATION
Payer: MEDICAID

## 2020-06-30 PROCEDURE — 97110 THERAPEUTIC EXERCISES: CPT

## 2020-06-30 PROCEDURE — 97140 MANUAL THERAPY 1/> REGIONS: CPT

## 2020-06-30 NOTE — PROGRESS NOTES
Dx: Class 3 severe obesity due to excess calories with serious comorbidity and body mass index (BMI) of 45.0 to 49.9 in adult [facility]) (G75.67,B60.53)  Myalgia (M79.10)  Mechanical low back pain (M54.5)  Lumbar foraminal stenosis (M48.061)  Acute lumbar radic therapy interventions. No changes made to interventions today due to positive improvement post last session. Pt advised to avoid spinal flexed positions as he continues to report flexion sensitivity. Will continue to progress as per tolerance.      Goals: T

## 2020-07-02 ENCOUNTER — OFFICE VISIT (OUTPATIENT)
Dept: PHYSICAL THERAPY | Facility: HOSPITAL | Age: 35
End: 2020-07-02
Attending: PHYSICAL MEDICINE & REHABILITATION
Payer: MEDICAID

## 2020-07-02 PROCEDURE — 97140 MANUAL THERAPY 1/> REGIONS: CPT

## 2020-07-02 PROCEDURE — 97110 THERAPEUTIC EXERCISES: CPT

## 2020-07-02 NOTE — PROGRESS NOTES
Dx: Class 3 severe obesity due to excess calories with serious comorbidity and body mass index (BMI) of 45.0 to 49.9 in adult [facility]) (Y25.09,T47.56)  Myalgia (M79.10)  Mechanical low back pain (M54.5)  Lumbar foraminal stenosis (M48.061)  Acute lumbar radic reinitiated today with low intensity activities with no ill effects. This is a positive sign of improvement. Pt continues to endorse flexion sensitivity and pt was reassured that this is likely to continue to improve.  Pt educated on degree of improvement o

## 2020-07-07 ENCOUNTER — OFFICE VISIT (OUTPATIENT)
Dept: PHYSICAL THERAPY | Facility: HOSPITAL | Age: 35
End: 2020-07-07
Attending: PHYSICAL MEDICINE & REHABILITATION
Payer: MEDICAID

## 2020-07-07 PROCEDURE — 97110 THERAPEUTIC EXERCISES: CPT

## 2020-07-07 PROCEDURE — 97140 MANUAL THERAPY 1/> REGIONS: CPT

## 2020-07-07 NOTE — PROGRESS NOTES
Dx: Class 3 severe obesity due to excess calories with serious comorbidity and body mass index (BMI) of 45.0 to 49.9 in adult [facility]) (Y17.15,A54.83)  Myalgia (M79.10)  Mechanical low back pain (M54.5)  Lumbar foraminal stenosis (M48.061)  Acute lumbar radic improvement in his RLE radicular symptoms. Jessy Nuñez. with report of less calf pain and less leg pain. Pt with chief complaint of pain at Upper Lumbar/Lower Thoracic.  Pt with decreased muscle tone post session today with improved thoracic mobility n

## 2020-07-09 ENCOUNTER — OFFICE VISIT (OUTPATIENT)
Dept: PHYSICAL THERAPY | Facility: HOSPITAL | Age: 35
End: 2020-07-09
Attending: PHYSICAL MEDICINE & REHABILITATION
Payer: MEDICAID

## 2020-07-09 PROCEDURE — 97110 THERAPEUTIC EXERCISES: CPT

## 2020-07-09 NOTE — PROGRESS NOTES
Dx: Class 3 severe obesity due to excess calories with serious comorbidity and body mass index (BMI) of 45.0 to 49.9 in adult [facility]) (V53.23,Z96.67)  Myalgia (M79.10)  Mechanical low back pain (M54.5)  Lumbar foraminal stenosis (M48.061)  Acute lumbar radic 1. Repeated EXT in lying  2. R side glide on wall  3. FLX with rotation R side down  4. Static EXT in lying  5. Repeated ext in standing 1. 4x10  3. 2x15  4x30 sec 3. 2x20  4. 4 mins x 5 3. 2x20 3. 2x20  5. 2x20 5. 2x20   N. Glide   1.  Ankle pump supine calf pain intensity is greatly reduced but does increased with forward flexion, he also demonstrates improved PF strength to 5/5. Pt does report minimally decreased light touch sensation on the RLE but pt reports this as minimal with sensation assessment.

## 2020-07-14 ENCOUNTER — TELEMEDICINE (OUTPATIENT)
Dept: NEUROLOGY | Facility: CLINIC | Age: 35
End: 2020-07-14

## 2020-07-14 ENCOUNTER — OFFICE VISIT (OUTPATIENT)
Dept: PHYSICAL THERAPY | Facility: HOSPITAL | Age: 35
End: 2020-07-14
Attending: PHYSICAL MEDICINE & REHABILITATION
Payer: MEDICAID

## 2020-07-14 DIAGNOSIS — M54.16 ACUTE LUMBAR RADICULOPATHY: ICD-10-CM

## 2020-07-14 DIAGNOSIS — M54.59 MECHANICAL LOW BACK PAIN: ICD-10-CM

## 2020-07-14 DIAGNOSIS — M54.16 LUMBAR RADICULOPATHY, ACUTE: ICD-10-CM

## 2020-07-14 DIAGNOSIS — M51.37 DDD (DEGENERATIVE DISC DISEASE), LUMBOSACRAL: ICD-10-CM

## 2020-07-14 DIAGNOSIS — M79.10 MYALGIA: ICD-10-CM

## 2020-07-14 DIAGNOSIS — S39.012D STRAIN OF LUMBAR REGION, SUBSEQUENT ENCOUNTER: ICD-10-CM

## 2020-07-14 DIAGNOSIS — M51.16 LUMBAR DISC HERNIATION WITH RADICULOPATHY: ICD-10-CM

## 2020-07-14 DIAGNOSIS — E66.01 CLASS 3 SEVERE OBESITY DUE TO EXCESS CALORIES WITH SERIOUS COMORBIDITY AND BODY MASS INDEX (BMI) OF 45.0 TO 49.9 IN ADULT (HCC): Primary | ICD-10-CM

## 2020-07-14 DIAGNOSIS — M48.061 LUMBAR FORAMINAL STENOSIS: ICD-10-CM

## 2020-07-14 PROBLEM — E66.813 CLASS 3 SEVERE OBESITY DUE TO EXCESS CALORIES WITH SERIOUS COMORBIDITY AND BODY MASS INDEX (BMI) OF 45.0 TO 49.9 IN ADULT: Status: ACTIVE | Noted: 2020-07-14

## 2020-07-14 PROBLEM — S39.012A STRAIN OF LUMBAR REGION: Status: ACTIVE | Noted: 2020-07-14

## 2020-07-14 PROBLEM — M51.379 DDD (DEGENERATIVE DISC DISEASE), LUMBOSACRAL: Status: ACTIVE | Noted: 2020-07-14

## 2020-07-14 PROBLEM — E66.813 CLASS 3 SEVERE OBESITY DUE TO EXCESS CALORIES WITH SERIOUS COMORBIDITY AND BODY MASS INDEX (BMI) OF 45.0 TO 49.9 IN ADULT (HCC): Status: ACTIVE | Noted: 2020-07-14

## 2020-07-14 PROCEDURE — 97110 THERAPEUTIC EXERCISES: CPT

## 2020-07-14 PROCEDURE — 99213 OFFICE O/P EST LOW 20 MIN: CPT | Performed by: PHYSICAL MEDICINE & REHABILITATION

## 2020-07-14 PROCEDURE — 97112 NEUROMUSCULAR REEDUCATION: CPT

## 2020-07-14 NOTE — PATIENT INSTRUCTIONS
1) Continue with physical therapy. Ask Emeka to work lifting techniques with you which you can utilize at work  2) Tylenol 500-1000 mg every 6-8 hours as needed for pain. No more than 3000 mg daily.   3) Use tramadol 50 mg only as needed  4) Return to work

## 2020-07-14 NOTE — PROGRESS NOTES
[address]  Video Visit Progress Note    Orvis Poor. verbally consents to a Telemedicine Visit on 07/14/20. This visit is conducted using Telemedicine with live, interactive audio and video.     Patient Oral Tab Take 1 tablet (50 mg total) by mouth every 6 (six) hours as needed for Pain. 60 tablet 0   • cyclobenzaprine 5 MG Oral Tab 5 mg 1-2 tablets three times per day as needed for spasms.  Do not operate heavy machinery while on this medication as it may extremities.    Sit to stand independent without any weakness, toe walk without any issues  Sensation: Self-assessment to crude touch is intact in bilateral lower extremities   Facet Loading: no specific facet pain  Gait Normal     Patient was advised to on 4.5  3.5 - 5.1 mmol/L Final   • Chloride 09/09/2019 105  98 - 112 mmol/L Final   • CO2 09/09/2019 28.0  21.0 - 32.0 mmol/L Final   • Anion Gap 09/09/2019 5  0 - 18 mmol/L Final   • BUN 09/09/2019 10  7 - 18 mg/dL Final   • Creatinine 09/09/2019 1.05  0.70 0. 03  0.00 - 1.00 x10(3) uL Final   • Neutrophil % 09/09/2019 52.5  % Final   • Lymphocyte % 09/09/2019 33.8  % Final   • Monocyte % 09/09/2019 10.8  % Final   • Eosinophil % 09/09/2019 2.1  % Final   • Basophil % 09/09/2019 0.4  % Final   • Immature Granu greater than left facet arthropathy. Mild right neural foraminal stenosis without additional significant neural compromise. Impression: CONCLUSION:   1.  L5-S1:  Mild right neural foraminal stenosis related to an eccentric disc bulge and facet immune response and may increase risk and complications of infection. The patient was advised that given the current situation with COVID-19, it is in his/her best interest to socially distance his/herself.  Given this, we are not recommending any electi

## 2020-07-14 NOTE — PROGRESS NOTES
Dx: Class 3 severe obesity due to excess calories with serious comorbidity and body mass index (BMI) of 45.0 to 49.9 in adult [facility]) (J69.37,K29.02)  Myalgia (M79.10)  Mechanical low back pain (M54.5)  Lumbar foraminal stenosis (M48.061)  Acute lumbar radic 2x10    1. Row with rotation   2. Straight Arm pull down 1 arm at a time  3. Horizontal shoulder ABD      1.2. 2x20 (B) GTB  3. 2x20 RTB                                            Neuro Re-education:          Lifting Progression  1. FWD FLX with Pole  2.  D

## 2020-07-16 ENCOUNTER — OFFICE VISIT (OUTPATIENT)
Dept: PHYSICAL THERAPY | Facility: HOSPITAL | Age: 35
End: 2020-07-16
Attending: PHYSICAL MEDICINE & REHABILITATION
Payer: MEDICAID

## 2020-07-16 PROCEDURE — 97110 THERAPEUTIC EXERCISES: CPT

## 2020-07-16 PROCEDURE — 97112 NEUROMUSCULAR REEDUCATION: CPT

## 2020-07-16 NOTE — PROGRESS NOTES
Dx: Class 3 severe obesity due to excess calories with serious comorbidity and body mass index (BMI) of 45.0 to 49.9 in adult [facility]) (M11.07,X90.65)  Myalgia (M79.10)  Mechanical low back pain (M54.5)  Lumbar foraminal stenosis (M48.061)  Acute lumbar radic GTB  3. 2x20 RTB                                              Neuro Re-education:          Lifting Progression  1. FWD FLX with Pole  2. Dead Lift with pole  3. Lifting Box from floor     1. 3x10  2. 2x10  3. 2x10 12# 1. 3x10  2. 2x10  3.  2x10 12#    Box C none known

## 2020-07-20 DIAGNOSIS — M54.59 MECHANICAL LOW BACK PAIN: ICD-10-CM

## 2020-07-20 DIAGNOSIS — M48.061 LUMBAR FORAMINAL STENOSIS: ICD-10-CM

## 2020-07-20 DIAGNOSIS — M54.16 ACUTE LUMBAR RADICULOPATHY: ICD-10-CM

## 2020-07-20 DIAGNOSIS — M79.10 MYALGIA: ICD-10-CM

## 2020-07-20 DIAGNOSIS — E66.01 CLASS 3 SEVERE OBESITY DUE TO EXCESS CALORIES WITH SERIOUS COMORBIDITY AND BODY MASS INDEX (BMI) OF 45.0 TO 49.9 IN ADULT (HCC): ICD-10-CM

## 2020-07-20 RX ORDER — CYCLOBENZAPRINE HCL 5 MG
TABLET ORAL
Qty: 90 TABLET | Refills: 0 | Status: SHIPPED | OUTPATIENT
Start: 2020-07-20 | End: 2020-11-12

## 2020-07-20 RX ORDER — TRAMADOL HYDROCHLORIDE 50 MG/1
50 TABLET ORAL EVERY 6 HOURS PRN
Qty: 60 TABLET | Refills: 0 | Status: SHIPPED | OUTPATIENT
Start: 2020-07-20

## 2020-07-20 NOTE — TELEPHONE ENCOUNTER
Medication request: Cyclobenzaprine 5mg Oral Tab, #90, no refills  1-2 tablets TID PRN    [place]: 6/3/20    Medication request: Tramadol 50mg Oral Tab, #60, no refills  Take 1 tablet by mouth q6hr PRN pain    [place]: 7/6/20    LOV: 7/

## 2020-07-21 ENCOUNTER — OFFICE VISIT (OUTPATIENT)
Dept: PHYSICAL THERAPY | Facility: HOSPITAL | Age: 35
End: 2020-07-21
Attending: PHYSICAL MEDICINE & REHABILITATION
Payer: MEDICAID

## 2020-07-21 PROCEDURE — 97110 THERAPEUTIC EXERCISES: CPT

## 2020-07-21 PROCEDURE — 97140 MANUAL THERAPY 1/> REGIONS: CPT

## 2020-07-24 ENCOUNTER — OFFICE VISIT (OUTPATIENT)
Dept: PHYSICAL THERAPY | Facility: HOSPITAL | Age: 35
End: 2020-07-24
Attending: PHYSICAL MEDICINE & REHABILITATION
Payer: MEDICAID

## 2020-07-24 PROCEDURE — 97112 NEUROMUSCULAR REEDUCATION: CPT

## 2020-07-24 PROCEDURE — 97110 THERAPEUTIC EXERCISES: CPT

## 2020-07-24 NOTE — PROGRESS NOTES
Dx: Class 3 severe obesity due to excess calories with serious comorbidity and body mass index (BMI) of 45.0 to 49.9 in adult [facility]) (B18.10,F44.67)  Myalgia (M79.10)  Mechanical low back pain (M54.5)  Lumbar foraminal stenosis (M48.061)  Acute lumbar radic down 1 arm at a time  3. Horizontal shoulder ABD   1.2. 2x20 (B) GTB  3. 2x20 RTB                                                    Neuro Re-education:           Lifting Progression  1. FWD FLX with Pole  2. Dead Lift with pole  3.  Lifting Box from floor

## 2020-07-28 ENCOUNTER — APPOINTMENT (OUTPATIENT)
Dept: PHYSICAL THERAPY | Facility: HOSPITAL | Age: 35
End: 2020-07-28
Attending: PHYSICAL MEDICINE & REHABILITATION
Payer: MEDICAID

## 2020-07-28 ENCOUNTER — TELEPHONE (OUTPATIENT)
Dept: PHYSICAL THERAPY | Age: 35
End: 2020-07-28

## 2020-07-30 ENCOUNTER — OFFICE VISIT (OUTPATIENT)
Dept: PHYSICAL THERAPY | Facility: HOSPITAL | Age: 35
End: 2020-07-30
Attending: PHYSICAL MEDICINE & REHABILITATION
Payer: MEDICAID

## 2020-07-30 PROCEDURE — 97110 THERAPEUTIC EXERCISES: CPT

## 2020-07-30 NOTE — PROGRESS NOTES
Dx: Class 3 severe obesity due to excess calories with serious comorbidity and body mass index (BMI) of 45.0 to 49.9 in adult [facility]) (V83.50,W43.98)  Myalgia (M79.10)  Mechanical low back pain (M54.5)  Lumbar foraminal stenosis (M48.061)  Acute lumbar radic supine  2. Standing 6in step  3. Knee FLX/EXT 1. 2x20  3. 2x20 1. 2x20  3. 2x20         Standing Hip  1. ABD  2. EXT  3. March 1-3 2x20 ea          1. Bridge 3x10 2x15    1. 2x20       2x15 2x15    1. 2x20     1. Thoracic EXT in sitting  2.  R/L Thoracic ro insurance purposes today. Pt at end of approved POC of 15 total visits. Pt has demonstrated improving lifting tolerance and was able to lift 42# 2x10 reps with min increased in Tspine discomfort.  Pt with greatly improved lifting kinematics with proper lumb

## 2020-07-31 ENCOUNTER — APPOINTMENT (OUTPATIENT)
Dept: PHYSICAL THERAPY | Facility: HOSPITAL | Age: 35
End: 2020-07-31
Attending: PHYSICAL MEDICINE & REHABILITATION
Payer: MEDICAID

## 2020-08-04 ENCOUNTER — OFFICE VISIT (OUTPATIENT)
Dept: PHYSICAL THERAPY | Facility: HOSPITAL | Age: 35
End: 2020-08-04
Attending: PHYSICAL MEDICINE & REHABILITATION
Payer: MEDICAID

## 2020-08-04 PROCEDURE — 97112 NEUROMUSCULAR REEDUCATION: CPT

## 2020-08-04 PROCEDURE — 97110 THERAPEUTIC EXERCISES: CPT

## 2020-08-04 NOTE — PROGRESS NOTES
Dx: Class 3 severe obesity due to excess calories with serious comorbidity and body mass index (BMI) of 45.0 to 49.9 in adult [facility]) (C03.20,T23.51)  Myalgia (M79.10)  Mechanical low back pain (M54.5)  Lumbar foraminal stenosis (M48.061)  Acute lumbar radic Neuro Re-education:          Lifting Progression  1. FWD FLX with Pole  2. Dead Lift with pole  3. Lifting Box from floor  4. Lifting box from floor rotation and place on plinth  5. [facility] Dead lift    1. 3x10  2. 2x10  3. 2x10 12# 1. 3x10  2. 2x10  3. demonstrate score of 20/100 on Oswestry to demonstrate return to maximum functional performance. - Making progress towards goal attainment  2.  Ramirez Palmer. will report he is able to lift 50# at work from the floor to his waist with 2/10 pain or less t

## 2020-08-06 ENCOUNTER — OFFICE VISIT (OUTPATIENT)
Dept: PHYSICAL THERAPY | Facility: HOSPITAL | Age: 35
End: 2020-08-06
Attending: PHYSICAL MEDICINE & REHABILITATION
Payer: MEDICAID

## 2020-08-06 PROCEDURE — 97112 NEUROMUSCULAR REEDUCATION: CPT

## 2020-08-06 PROCEDURE — 97110 THERAPEUTIC EXERCISES: CPT

## 2020-08-06 NOTE — PROGRESS NOTES
Dx: Class 3 severe obesity due to excess calories with serious comorbidity and body mass index (BMI) of 45.0 to 49.9 in adult [facility]) (Q19.04,N62.89)  Myalgia (M79.10)  Mechanical low back pain (M54.5)  Lumbar foraminal stenosis (M48.061)  Acute lumbar radic Horizontal shoulder ABD 1.2. 2x20 (B) GTB  3. 2x20 RTB          1. Cart Push       1. 4 mins 100# 1. 4 mins 100#   Hip EXT over plinth       2x20 (B) 2x20 (B)                         Neuro Re-education:           Lifting Progression  1.  FWD FLX with Pole performance. - Making progress towards goal attainment  2. Rella Mandril. will report he is able to lift 50# at work from the floor to his waist with 2/10 pain or less to demonstrate progress towards returning to work.  - making progress towards goal att

## 2020-08-07 ENCOUNTER — APPOINTMENT (OUTPATIENT)
Dept: PHYSICAL THERAPY | Facility: HOSPITAL | Age: 35
End: 2020-08-07
Attending: PHYSICAL MEDICINE & REHABILITATION
Payer: MEDICAID

## 2020-08-10 ENCOUNTER — OFFICE VISIT (OUTPATIENT)
Dept: NEUROLOGY | Facility: CLINIC | Age: 35
End: 2020-08-10
Payer: MEDICAID

## 2020-08-10 DIAGNOSIS — M54.16 ACUTE LUMBAR RADICULOPATHY: ICD-10-CM

## 2020-08-10 DIAGNOSIS — M51.16 LUMBAR DISC HERNIATION WITH RADICULOPATHY: ICD-10-CM

## 2020-08-10 DIAGNOSIS — M54.16 LUMBAR RADICULOPATHY, ACUTE: ICD-10-CM

## 2020-08-10 DIAGNOSIS — E66.01 CLASS 3 SEVERE OBESITY DUE TO EXCESS CALORIES WITH SERIOUS COMORBIDITY AND BODY MASS INDEX (BMI) OF 45.0 TO 49.9 IN ADULT (HCC): Primary | ICD-10-CM

## 2020-08-10 DIAGNOSIS — M48.061 LUMBAR FORAMINAL STENOSIS: ICD-10-CM

## 2020-08-10 DIAGNOSIS — M51.37 DDD (DEGENERATIVE DISC DISEASE), LUMBOSACRAL: ICD-10-CM

## 2020-08-10 DIAGNOSIS — S39.012D STRAIN OF LUMBAR REGION, SUBSEQUENT ENCOUNTER: ICD-10-CM

## 2020-08-10 DIAGNOSIS — M54.59 MECHANICAL LOW BACK PAIN: ICD-10-CM

## 2020-08-10 DIAGNOSIS — M79.10 MYALGIA: ICD-10-CM

## 2020-08-10 PROCEDURE — 99214 OFFICE O/P EST MOD 30 MIN: CPT | Performed by: PHYSICAL MEDICINE & REHABILITATION

## 2020-08-10 NOTE — PROGRESS NOTES
[address]  Progress Note    CHIEF COMPLAINT:  Patient presents with:  Low Back Pain: LOV: 7/14/20 for telemedicine appointment.  Pt notes that PT has been improving since starting, that he believes he may be day as needed for spasms. Do not operate heavy machinery while on this medication as it may make you sleepy 90 tablet 0   • traMADol HCl 50 MG Oral Tab Take 1 tablet (50 mg total) by mouth every 6 (six) hours as needed for Pain.  60 tablet 0   • ALBUTEROL S active range of motion of the lumbar spine  Motor: 5/5 in all myotomes of the BILATERAL lower extremities   Sensation: Intact to light touch in all dermatomes of the lower extremities except decreased sensation to light touch at right L3-S1 dermatomes  Ref Final   • MCV 09/09/2019 87.1  80.0 - 100.0 fL Final   • MCH 09/09/2019 28.7  26.0 - 34.0 pg Final   • MCHC 09/09/2019 32.9  31.0 - 37.0 g/dL Final   • RDW-SD 09/09/2019 38.7  35.1 - 46.3 fL Final   • RDW 09/09/2019 12.1  11.0 - 15.0 % Final   • PLT 09/09/ body.  CORD/CAUDA EQUINA: The distal cord and nerve roots have normal caliber, contour, and signal intensity. PARASPINAL AREA: No visible mass. OTHER: Negative.      LUMBAR DISC LEVELS:  L1-L2: No significant disc/facet abnormality, spinal stenosis, or return to work with the following restrictions on August 26, 2020: Pushing, pulling, lifting, and carrying 50 pounds occasionally and 20 pounds frequently. Limited forward flexion. I will follow-up with Curtis Mo in 6 weeks.        RTC in 6 weeks  Discharge

## 2020-08-10 NOTE — PATIENT INSTRUCTIONS
1) Tylenol 500-1000 mg every 6-8 hours as needed for pain. No more than 3000 mg daily. 2) Continue flexeril 5-10 mg three times per day as needed for pain and spasms.    3) Stop tramadol  4) Follow up with me in 6 weeks  5) Return to work with the followi

## 2020-08-12 ENCOUNTER — OFFICE VISIT (OUTPATIENT)
Dept: PHYSICAL THERAPY | Facility: HOSPITAL | Age: 35
End: 2020-08-12
Attending: PHYSICAL MEDICINE & REHABILITATION
Payer: MEDICAID

## 2020-08-12 PROCEDURE — 97112 NEUROMUSCULAR REEDUCATION: CPT

## 2020-08-12 PROCEDURE — 97110 THERAPEUTIC EXERCISES: CPT

## 2020-08-12 NOTE — PROGRESS NOTES
Dx: Class 3 severe obesity due to excess calories with serious comorbidity and body mass index (BMI) of 45.0 to 49.9 in adult [facility]) (E85.36,A36.03)  Myalgia (M79.10)  Mechanical low back pain (M54.5)  Lumbar foraminal stenosis (M48.061)  Acute lumbar radic shoulder ABD           1. Cart Push      1. 4 mins 100# 1. 4 mins 100#    Hip EXT over plinth      2x20 (B) 2x20 (B) 2x20 (B)   Squats        2x20              Neuro Re-education:           Lifting Progression  1. FWD FLX with Pole  2.  Dead Lift with pole

## 2020-08-19 ENCOUNTER — APPOINTMENT (OUTPATIENT)
Dept: PHYSICAL THERAPY | Facility: HOSPITAL | Age: 35
End: 2020-08-19
Attending: PHYSICAL MEDICINE & REHABILITATION
Payer: MEDICAID

## 2020-08-26 ENCOUNTER — APPOINTMENT (OUTPATIENT)
Dept: PHYSICAL THERAPY | Facility: HOSPITAL | Age: 35
End: 2020-08-26
Attending: PHYSICAL MEDICINE & REHABILITATION
Payer: MEDICAID

## 2020-08-28 ENCOUNTER — APPOINTMENT (OUTPATIENT)
Dept: PHYSICAL THERAPY | Facility: HOSPITAL | Age: 35
End: 2020-08-28
Attending: PHYSICAL MEDICINE & REHABILITATION
Payer: MEDICAID

## 2020-09-01 ENCOUNTER — APPOINTMENT (OUTPATIENT)
Dept: PHYSICAL THERAPY | Facility: HOSPITAL | Age: 35
End: 2020-09-01
Attending: PHYSICAL MEDICINE & REHABILITATION
Payer: MEDICAID

## 2020-09-04 ENCOUNTER — APPOINTMENT (OUTPATIENT)
Dept: PHYSICAL THERAPY | Facility: HOSPITAL | Age: 35
End: 2020-09-04
Attending: PHYSICAL MEDICINE & REHABILITATION
Payer: MEDICAID

## 2020-09-08 ENCOUNTER — APPOINTMENT (OUTPATIENT)
Dept: PHYSICAL THERAPY | Facility: HOSPITAL | Age: 35
End: 2020-09-08
Attending: PHYSICAL MEDICINE & REHABILITATION
Payer: MEDICAID

## 2020-09-10 ENCOUNTER — APPOINTMENT (OUTPATIENT)
Dept: PHYSICAL THERAPY | Facility: HOSPITAL | Age: 35
End: 2020-09-10
Attending: PHYSICAL MEDICINE & REHABILITATION
Payer: MEDICAID

## 2020-09-15 ENCOUNTER — APPOINTMENT (OUTPATIENT)
Dept: PHYSICAL THERAPY | Facility: HOSPITAL | Age: 35
End: 2020-09-15
Attending: PHYSICAL MEDICINE & REHABILITATION
Payer: MEDICAID

## 2020-09-16 ENCOUNTER — TELEPHONE (OUTPATIENT)
Dept: PHYSICAL THERAPY | Facility: HOSPITAL | Age: 35
End: 2020-09-16

## 2020-09-16 NOTE — TELEPHONE ENCOUNTER
Pt called back. Will follow up with Dr. Clive Luna on 9/21. Will return to PT if deemed appropriate by Dr. Clive Luna. Pt agreeable with plan.

## 2020-09-21 ENCOUNTER — OFFICE VISIT (OUTPATIENT)
Dept: NEUROLOGY | Facility: CLINIC | Age: 35
End: 2020-09-21
Payer: MEDICAID

## 2020-09-21 VITALS — BODY MASS INDEX: 40.43 KG/M2 | HEIGHT: 74 IN | WEIGHT: 315 LBS

## 2020-09-21 DIAGNOSIS — M79.10 MYALGIA: ICD-10-CM

## 2020-09-21 DIAGNOSIS — S39.012D STRAIN OF LUMBAR REGION, SUBSEQUENT ENCOUNTER: ICD-10-CM

## 2020-09-21 DIAGNOSIS — E66.01 CLASS 3 SEVERE OBESITY DUE TO EXCESS CALORIES WITH SERIOUS COMORBIDITY AND BODY MASS INDEX (BMI) OF 45.0 TO 49.9 IN ADULT (HCC): ICD-10-CM

## 2020-09-21 DIAGNOSIS — M51.37 DDD (DEGENERATIVE DISC DISEASE), LUMBOSACRAL: ICD-10-CM

## 2020-09-21 DIAGNOSIS — M54.59 MECHANICAL LOW BACK PAIN: Primary | ICD-10-CM

## 2020-09-21 PROCEDURE — 99214 OFFICE O/P EST MOD 30 MIN: CPT | Performed by: PHYSICAL MEDICINE & REHABILITATION

## 2020-09-21 PROCEDURE — 3008F BODY MASS INDEX DOCD: CPT | Performed by: PHYSICAL MEDICINE & REHABILITATION

## 2020-09-21 RX ORDER — NAPROXEN 500 MG/1
500 TABLET ORAL 2 TIMES DAILY PRN
Qty: 60 TABLET | Refills: 0 | Status: SHIPPED | OUTPATIENT
Start: 2020-09-21

## 2020-09-21 NOTE — PROGRESS NOTES
[address]  Progress Note    CHIEF COMPLAINT:  Patient presents with:  Low Back Pain: Pt states that hes following up with low back pain.  States that it hasnt gotten worst but its stable       History of Pre cyclobenzaprine 5 MG Oral Tab 5 mg 1-2 tablets three times per day as needed for spasms.  Do not operate heavy machinery while on this medication as it may make you sleepy 90 tablet 0   • traMADol HCl 50 MG Oral Tab Take 1 tablet (50 mg total) by mouth ever paraspinals, right quadratus lumborum  ROM: Full active range of motion of the lumbar spine with pain during extension and rotation to the right  Motor: 5/5 in all myotomes of the BILATERAL lower extremities   Sensation: Intact to light touch in all dermat 09/09/2019 87.1  80.0 - 100.0 fL Final   • MCH 09/09/2019 28.7  26.0 - 34.0 pg Final   • MCHC 09/09/2019 32.9  31.0 - 37.0 g/dL Final   • RDW-SD 09/09/2019 38.7  35.1 - 46.3 fL Final   • RDW 09/09/2019 12.1  11.0 - 15.0 % Final   • PLT 09/09/2019 286.0  15 body.  CORD/CAUDA EQUINA: The distal cord and nerve roots have normal caliber, contour, and signal intensity. PARASPINAL AREA: No visible mass. OTHER: Negative.      LUMBAR DISC LEVELS:  L1-L2: No significant disc/facet abnormality, spinal stenosis, or does have an effusion at the right facet joint. If he does not improve with his continued home exercise program and the Naprosyn, I would recommend a right L4-L5 and L5-S1 facet joint injection.   I have also given him a referral to see our bariatrics team

## 2020-09-21 NOTE — PATIENT INSTRUCTIONS
1) Make an appointment to see Dr. Royal Barragan (Weight Loss)  2) Continue with your home exercise program  3) Think about the RIGHT L4-L5 and L5-S1 facet joint injection  4) Use naprosyn twice per day as needed for pain  5) Follow up in 6 weeks  6) Return t

## 2020-10-06 ENCOUNTER — APPOINTMENT (OUTPATIENT)
Dept: PHYSICAL THERAPY | Facility: HOSPITAL | Age: 35
End: 2020-10-06
Attending: PHYSICAL MEDICINE & REHABILITATION
Payer: MEDICAID

## 2020-11-12 ENCOUNTER — TELEMEDICINE (OUTPATIENT)
Dept: NEUROLOGY | Facility: CLINIC | Age: 35
End: 2020-11-12

## 2020-11-12 DIAGNOSIS — M48.061 LUMBAR FORAMINAL STENOSIS: ICD-10-CM

## 2020-11-12 DIAGNOSIS — M54.16 ACUTE LUMBAR RADICULOPATHY: ICD-10-CM

## 2020-11-12 DIAGNOSIS — M54.59 MECHANICAL LOW BACK PAIN: Primary | ICD-10-CM

## 2020-11-12 DIAGNOSIS — M79.10 MYALGIA: ICD-10-CM

## 2020-11-12 DIAGNOSIS — M54.16 LUMBAR RADICULOPATHY, ACUTE: ICD-10-CM

## 2020-11-12 DIAGNOSIS — E66.01 CLASS 3 SEVERE OBESITY DUE TO EXCESS CALORIES WITH SERIOUS COMORBIDITY AND BODY MASS INDEX (BMI) OF 45.0 TO 49.9 IN ADULT (HCC): ICD-10-CM

## 2020-11-12 DIAGNOSIS — M51.16 LUMBAR DISC HERNIATION WITH RADICULOPATHY: ICD-10-CM
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
## 2020-11-12 DIAGNOSIS — S39.012D STRAIN OF LUMBAR REGION, SUBSEQUENT ENCOUNTER: ICD-10-CM

## 2020-11-12 DIAGNOSIS — M51.37 DDD (DEGENERATIVE DISC DISEASE), LUMBOSACRAL: ICD-10-CM

## 2020-11-12 PROCEDURE — 99213 OFFICE O/P EST LOW 20 MIN: CPT | Performed by: PHYSICAL MEDICINE & REHABILITATION

## 2020-11-12 RX ORDER — CYCLOBENZAPRINE HCL 5 MG
TABLET ORAL
Qty: 90 TABLET | Refills: 0 | Status: SHIPPED | OUTPATIENT
Start: 2020-11-12 | End: 2021-07-27

## 2020-11-12 RX ORDER — NAPROXEN 500 MG/1
500 TABLET ORAL 2 TIMES DAILY WITH MEALS
Qty: 60 TABLET | Refills: 0 | Status: SHIPPED | OUTPATIENT
Start: 2020-11-12 | End: 2021-07-27

## 2020-11-12 NOTE — PROGRESS NOTES
[address]  Video Visit Progress Note    Ewa Luevano verbally consents to a Telemedicine Visit on 11/12/20. This visit is conducted using Telemedicine with live, interactive audio and video.     Patient medication as it may make you sleepy 90 tablet 0   • traMADol HCl 50 MG Oral Tab Take 1 tablet (50 mg total) by mouth every 6 (six) hours as needed for Pain.  60 tablet 0   • ALBUTEROL SULFATE  (90 Base) MCG/ACT Inhalation Aero Soln INHALE 1-2 PUFFS • BUN 09/09/2019 10  7 - 18 mg/dL Final   • Creatinine 09/09/2019 1.05  0.70 - 1.30 mg/dL Final   • BUN/CREA Ratio 09/09/2019 9.5* 10.0 - 20.0 Final   • Calcium, Total 09/09/2019 9.5  8.5 - 10.1 mg/dL Final   • Calculated Osmolality 09/09/2019 237 508 - 09/09/2019 2.1  % Final   • Basophil % 09/09/2019 0.4  % Final   • Immature Granulocyte % 09/09/2019 0.4  % Final   ]      Radiology Imaging:  I reviewed with the patient his MRI of the lumbar spine from 6/4/20  MRI SPINE LUMBAR (CPT=72148)  Narrative: PRO right neural foraminal stenosis related to an eccentric disc bulge and facet arthropathy. 2. L4-L5:  Central/right paracentral disc protrusion. No neural compromise.      Dictated by (CST): Silvestre Santana MD on 6/04/2020 at 12:46 PM       Finalized by (C back pain  (primary encounter diagnosis)  Myalgia  DDD (degenerative disc disease), lumbosacral  Class 3 severe obesity due to excess calories with serious comorbidity and body mass index (BMI) of 45.0 to 49.9 in adult [facility])  Strain of lumbar region, subse

## 2020-11-12 NOTE — PATIENT INSTRUCTIONS
1) Continue HEP  2) Continue weight loss. Stop juices, soda, cheese, and tortilla's  3) Take Naprosyn 500 mg 1 tablet twice per day with food for the next two weeks and then as needed but no more than 2 tablets per day.  Do not take with any other NSAIDS (I

## 2020-11-30 ENCOUNTER — TELEMEDICINE (OUTPATIENT)
Dept: FAMILY MEDICINE CLINIC | Facility: CLINIC | Age: 35
End: 2020-11-30
Payer: MEDICAID

## 2020-11-30 DIAGNOSIS — J01.90 ACUTE SINUSITIS, RECURRENCE NOT SPECIFIED, UNSPECIFIED LOCATION: Primary | ICD-10-CM

## 2020-11-30 DIAGNOSIS — J30.2 SEASONAL ALLERGIC RHINITIS: ICD-10-CM

## 2020-11-30 PROCEDURE — 99213 OFFICE O/P EST LOW 20 MIN: CPT | Performed by: FAMILY MEDICINE

## 2020-11-30 RX ORDER — ALBUTEROL SULFATE 90 UG/1
1-2 AEROSOL, METERED RESPIRATORY (INHALATION) EVERY 6 HOURS PRN
Qty: 18 G | Refills: 0 | Status: SHIPPED | OUTPATIENT
Start: 2020-11-30

## 2020-11-30 RX ORDER — AMOXICILLIN 875 MG/1
875 TABLET, COATED ORAL 2 TIMES DAILY
Qty: 20 TABLET | Refills: 0 | Status: SHIPPED | OUTPATIENT
Start: 2020-11-30 | End: 2021-07-27

## 2020-11-30 NOTE — PROGRESS NOTES
HPI:    Patient ID: Keily Whelan is a 37year old male. Virtual Telephone Check-In    Keily Whelan verbally consents to a Virtual/Telephone Check-In visit on 11/30/20.   Patient has been referred to the [facility] website at [email or web address] SULFATE  (90 Base) MCG/ACT Inhalation Aero Soln INHALE 1-2 PUFFS INTO THE LUNGS EVERY 6 (SIX) HOURS AS NEEDED FOR WHEEZING.  18 g 0   • CETIRIZINE 10 MG Oral Tab TAKE ONE TABLET BY MOUTH ONE TIME DAILY  30 tablet 1     Allergies:No Known Allergies

## 2020-12-01 ENCOUNTER — APPOINTMENT (OUTPATIENT)
Dept: LAB | Facility: HOSPITAL | Age: 35
End: 2020-12-01
Attending: FAMILY MEDICINE
Payer: MEDICAID

## 2020-12-01 DIAGNOSIS — J01.90 ACUTE SINUSITIS, RECURRENCE NOT SPECIFIED, UNSPECIFIED LOCATION: ICD-10-CM

## 2021-07-27 ENCOUNTER — OFFICE VISIT (OUTPATIENT)
Dept: FAMILY MEDICINE CLINIC | Facility: CLINIC | Age: 36
End: 2021-07-27
Payer: MEDICAID

## 2021-07-27 VITALS
HEIGHT: 72.2 IN | SYSTOLIC BLOOD PRESSURE: 143 MMHG | WEIGHT: 315 LBS | HEART RATE: 80 BPM | DIASTOLIC BLOOD PRESSURE: 90 MMHG | RESPIRATION RATE: 20 BRPM | TEMPERATURE: 97 F | BODY MASS INDEX: 42.66 KG/M2

## 2021-07-27 DIAGNOSIS — S93.401A SPRAIN OF RIGHT ANKLE, UNSPECIFIED LIGAMENT, INITIAL ENCOUNTER: ICD-10-CM

## 2021-07-27 DIAGNOSIS — M54.50 DORSALGIA OF LUMBAR REGION: ICD-10-CM

## 2021-07-27 PROCEDURE — 3077F SYST BP >= 140 MM HG: CPT | Performed by: FAMILY MEDICINE

## 2021-07-27 PROCEDURE — 3008F BODY MASS INDEX DOCD: CPT | Performed by: FAMILY MEDICINE

## 2021-07-27 PROCEDURE — 3080F DIAST BP >= 90 MM HG: CPT | Performed by: FAMILY MEDICINE

## 2021-07-27 PROCEDURE — 99214 OFFICE O/P EST MOD 30 MIN: CPT | Performed by: FAMILY MEDICINE

## 2021-07-27 RX ORDER — CYCLOBENZAPRINE HCL 10 MG
10 TABLET ORAL NIGHTLY
Qty: 30 TABLET | Refills: 0 | Status: SHIPPED | OUTPATIENT
Start: 2021-07-27

## 2021-07-27 RX ORDER — HYDROCODONE BITARTRATE AND ACETAMINOPHEN 5; 325 MG/1; MG/1
1 TABLET ORAL EVERY 6 HOURS PRN
Qty: 30 TABLET | Refills: 0 | Status: SHIPPED | OUTPATIENT
Start: 2021-07-27

## 2021-07-27 NOTE — PROGRESS NOTES
HPI/Subjective:   Patient ID: Antoinette Uribe is a 31year old male. Pt presents with hx of back pains after an accident. Was seeing Dr Avinash Newman and was doing well. Pt states he has been working recentlyand now has had back spasms from this.  Pt states musc Musculoskeletal:         General: Normal range of motion. Lumbar back: Normal. No swelling or deformity. Right ankle: No swelling, deformity or ecchymosis. No tenderness. Normal range of motion.       Right Achilles Tendon: Normal.   Neurologica

## 2021-07-28 ENCOUNTER — TELEPHONE (OUTPATIENT)
Dept: FAMILY MEDICINE CLINIC | Facility: CLINIC | Age: 36
End: 2021-07-28

## 2021-07-28 NOTE — TELEPHONE ENCOUNTER
Patient is requesting a doctor's note to be off from work to 2-3 weeks until he sees a specialist for his back pain. Please advise. Thank you.

## 2021-07-28 NOTE — TELEPHONE ENCOUNTER
Message noted and letter generated as requested. Sent to patient via [facility]. Pt notified.

## 2023-04-11 ENCOUNTER — OFFICE VISIT (OUTPATIENT)
Dept: FAMILY MEDICINE CLINIC | Facility: CLINIC | Age: 38
End: 2023-04-11

## 2023-04-11 VITALS
RESPIRATION RATE: 16 BRPM | WEIGHT: 315 LBS | DIASTOLIC BLOOD PRESSURE: 88 MMHG | OXYGEN SATURATION: 96 % | SYSTOLIC BLOOD PRESSURE: 156 MMHG | TEMPERATURE: 98 F | BODY MASS INDEX: 42.66 KG/M2 | HEART RATE: 85 BPM | HEIGHT: 72 IN

## 2023-04-11 DIAGNOSIS — J30.2 SEASONAL ALLERGIC RHINITIS: ICD-10-CM

## 2023-04-11 DIAGNOSIS — R05.1 ACUTE COUGH: Primary | ICD-10-CM

## 2023-04-11 PROCEDURE — 3008F BODY MASS INDEX DOCD: CPT | Performed by: FAMILY MEDICINE

## 2023-04-11 PROCEDURE — 3077F SYST BP >= 140 MM HG: CPT | Performed by: FAMILY MEDICINE

## 2023-04-11 PROCEDURE — 3079F DIAST BP 80-89 MM HG: CPT | Performed by: FAMILY MEDICINE

## 2023-04-11 PROCEDURE — 99213 OFFICE O/P EST LOW 20 MIN: CPT | Performed by: FAMILY MEDICINE

## 2023-04-11 RX ORDER — AMOXICILLIN AND CLAVULANATE POTASSIUM 875; 125 MG/1; MG/1
1 TABLET, FILM COATED ORAL 2 TIMES DAILY
Qty: 14 TABLET | Refills: 0 | Status: SHIPPED | OUTPATIENT
Start: 2023-04-11

## 2023-04-11 RX ORDER — FLUTICASONE PROPIONATE 50 MCG
1 SPRAY, SUSPENSION (ML) NASAL DAILY
Qty: 1 EACH | Refills: 2 | Status: SHIPPED | OUTPATIENT
Start: 2023-04-11

## 2023-04-11 RX ORDER — ALBUTEROL SULFATE 90 UG/1
1-2 AEROSOL, METERED RESPIRATORY (INHALATION) EVERY 6 HOURS PRN
Qty: 18 G | Refills: 2 | Status: SHIPPED | OUTPATIENT
Start: 2023-04-11

## 2023-05-27 ENCOUNTER — OFFICE VISIT (OUTPATIENT)
Dept: FAMILY MEDICINE CLINIC | Facility: CLINIC | Age: 38
End: 2023-05-27

## 2023-05-27 ENCOUNTER — LAB ENCOUNTER (OUTPATIENT)
Dept: LAB | Age: 38
End: 2023-05-27
Attending: FAMILY MEDICINE
Payer: MEDICAID

## 2023-05-27 VITALS
SYSTOLIC BLOOD PRESSURE: 142 MMHG | WEIGHT: 315 LBS | TEMPERATURE: 98 F | DIASTOLIC BLOOD PRESSURE: 86 MMHG | HEART RATE: 78 BPM | BODY MASS INDEX: 42.66 KG/M2 | RESPIRATION RATE: 16 BRPM | HEIGHT: 72 IN

## 2023-05-27 DIAGNOSIS — H00.011 HORDEOLUM EXTERNUM OF RIGHT UPPER EYELID: ICD-10-CM

## 2023-05-27 DIAGNOSIS — Z00.00 ROUTINE PHYSICAL EXAMINATION: ICD-10-CM

## 2023-05-27 DIAGNOSIS — M54.50 DORSALGIA OF LUMBAR REGION: ICD-10-CM

## 2023-05-27 DIAGNOSIS — Z00.00 ROUTINE PHYSICAL EXAMINATION: Primary | ICD-10-CM

## 2023-05-27 LAB
ALBUMIN SERPL-MCNC: 4 G/DL (ref 3.4–5)
ALBUMIN/GLOB SERPL: 1.1 {RATIO} (ref 1–2)
ALP LIVER SERPL-CCNC: 75 U/L
ALT SERPL-CCNC: 33 U/L
ANION GAP SERPL CALC-SCNC: 4 MMOL/L (ref 0–18)
AST SERPL-CCNC: 33 U/L (ref 15–37)
BILIRUB SERPL-MCNC: 0.7 MG/DL (ref 0.1–2)
BUN BLD-MCNC: 11 MG/DL (ref 7–18)
BUN/CREAT SERPL: 11.1 (ref 10–20)
CALCIUM BLD-MCNC: 9 MG/DL (ref 8.5–10.1)
CHLORIDE SERPL-SCNC: 106 MMOL/L (ref 98–112)
CHOLEST SERPL-MCNC: 135 MG/DL (ref ?–200)
CO2 SERPL-SCNC: 30 MMOL/L (ref 21–32)
CREAT BLD-MCNC: 0.99 MG/DL
DEPRECATED RDW RBC AUTO: 36.6 FL (ref 35.1–46.3)
ERYTHROCYTE [DISTWIDTH] IN BLOOD BY AUTOMATED COUNT: 11.9 % (ref 11–15)
FASTING PATIENT LIPID ANSWER: YES
FASTING STATUS PATIENT QL REPORTED: YES
GFR SERPLBLD BASED ON 1.73 SQ M-ARVRAT: 102 ML/MIN/1.73M2 (ref 60–?)
GLOBULIN PLAS-MCNC: 3.7 G/DL (ref 2.8–4.4)
GLUCOSE BLD-MCNC: 164 MG/DL (ref 70–99)
HCT VFR BLD AUTO: 48.2 %
HDLC SERPL-MCNC: 42 MG/DL (ref 40–59)
HGB BLD-MCNC: 16.1 G/DL
LDLC SERPL CALC-MCNC: 64 MG/DL (ref ?–100)
MCH RBC QN AUTO: 28.7 PG (ref 26–34)
MCHC RBC AUTO-ENTMCNC: 33.4 G/DL (ref 31–37)
MCV RBC AUTO: 85.9 FL
NONHDLC SERPL-MCNC: 93 MG/DL (ref ?–130)
OSMOLALITY SERPL CALC.SUM OF ELEC: 293 MOSM/KG (ref 275–295)
PLATELET # BLD AUTO: 292 10(3)UL (ref 150–450)
POTASSIUM SERPL-SCNC: 4.1 MMOL/L (ref 3.5–5.1)
PROT SERPL-MCNC: 7.7 G/DL (ref 6.4–8.2)
RBC # BLD AUTO: 5.61 X10(6)UL
SODIUM SERPL-SCNC: 140 MMOL/L (ref 136–145)
TRIGL SERPL-MCNC: 175 MG/DL (ref 30–149)
VLDLC SERPL CALC-MCNC: 26 MG/DL (ref 0–30)
WBC # BLD AUTO: 8.5 X10(3) UL (ref 4–11)

## 2023-05-27 PROCEDURE — 80061 LIPID PANEL: CPT

## 2023-05-27 PROCEDURE — 80053 COMPREHEN METABOLIC PANEL: CPT

## 2023-05-27 PROCEDURE — 85027 COMPLETE CBC AUTOMATED: CPT

## 2023-05-27 PROCEDURE — 36415 COLL VENOUS BLD VENIPUNCTURE: CPT

## 2023-05-27 RX ORDER — HYDROCODONE BITARTRATE AND ACETAMINOPHEN 5; 325 MG/1; MG/1
1 TABLET ORAL EVERY 6 HOURS PRN
Qty: 15 TABLET | Refills: 0 | Status: SHIPPED | OUTPATIENT
Start: 2023-05-27

## 2023-05-30 DIAGNOSIS — R73.09 ELEVATED GLUCOSE: Primary | ICD-10-CM

## 2023-08-01 ENCOUNTER — TELEPHONE (OUTPATIENT)
Dept: FAMILY MEDICINE CLINIC | Facility: CLINIC | Age: 38
End: 2023-08-01

## 2023-08-01 PROBLEM — S62.639A FRACTURE OF DISTAL PHALANX OF FINGER: Status: ACTIVE | Noted: 2023-07-29

## 2023-08-01 PROBLEM — S61.219A LACERATION OF FINGER: Status: ACTIVE | Noted: 2023-07-29

## 2023-08-01 NOTE — TELEPHONE ENCOUNTER
Patient leaving to [facility] 8/3/23. He has [employer] duty 8/17and seeking an excuse letter. He states he is a very nervous person. He gets anxiety and does not want to make [employer] duty. Advised appt needed to justify the need for the letter. Appt offered Thursday. Patient not able to make it. Advised he call [facility] to postpone date since he is leaving on vacation and not able to make it. They should be able to reschedule. Patient verbalized understanding.

## 2023-08-01 NOTE — TELEPHONE ENCOUNTER
Patient called back and asked to be scheduled with first available provider:    Future Appointments   Date Time Provider Chio Villa   8/1/2023  1:15 PM EDD Brunson [facility]

## 2023-08-24 ENCOUNTER — HOSPITAL ENCOUNTER (EMERGENCY)
Facility: HOSPITAL | Age: 38
Discharge: HOME | End: 2023-08-24
Attending: EMERGENCY MEDICINE
Payer: MEDICAID

## 2023-08-24 ENCOUNTER — APPOINTMENT (OUTPATIENT)
Dept: GENERAL RADIOLOGY | Facility: HOSPITAL | Age: 38
End: 2023-08-24
Payer: MEDICAID

## 2023-08-24 VITALS
OXYGEN SATURATION: 98 % | WEIGHT: 315 LBS | HEART RATE: 57 BPM | DIASTOLIC BLOOD PRESSURE: 88 MMHG | BODY MASS INDEX: 49 KG/M2 | RESPIRATION RATE: 21 BRPM | SYSTOLIC BLOOD PRESSURE: 170 MMHG | TEMPERATURE: 98 F

## 2023-08-24 DIAGNOSIS — R07.9 CHEST PAIN OF UNCERTAIN ETIOLOGY: Primary | ICD-10-CM

## 2023-08-24 LAB
ALBUMIN SERPL-MCNC: 3.7 G/DL (ref 3.4–5)
ALBUMIN/GLOB SERPL: 1.2 {RATIO} (ref 1–2)
ALP LIVER SERPL-CCNC: 74 U/L
ALT SERPL-CCNC: 25 U/L
ANION GAP SERPL CALC-SCNC: 5 MMOL/L (ref 0–18)
AST SERPL-CCNC: 22 U/L (ref 15–37)
ATRIAL RATE: 74 BPM
BASOPHILS # BLD AUTO: 0.04 X10(3) UL (ref 0–0.2)
BASOPHILS NFR BLD AUTO: 0.5 %
BILIRUB SERPL-MCNC: 0.3 MG/DL (ref 0.1–2)
BUN BLD-MCNC: 10 MG/DL (ref 7–18)
BUN/CREAT SERPL: 10.4 (ref 10–20)
CALCIUM BLD-MCNC: 8.7 MG/DL (ref 8.5–10.1)
CHLORIDE SERPL-SCNC: 108 MMOL/L (ref 98–112)
CO2 SERPL-SCNC: 27 MMOL/L (ref 21–32)
CREAT BLD-MCNC: 0.96 MG/DL
D DIMER PPP FEU-MCNC: 0.3 UG/ML FEU (ref ?–0.5)
DEPRECATED RDW RBC AUTO: 35.9 FL (ref 35.1–46.3)
EGFRCR SERPLBLD CKD-EPI 2021: 105 ML/MIN/1.73M2 (ref 60–?)
EOSINOPHIL # BLD AUTO: 0.16 X10(3) UL (ref 0–0.7)
EOSINOPHIL NFR BLD AUTO: 2.1 %
ERYTHROCYTE [DISTWIDTH] IN BLOOD BY AUTOMATED COUNT: 11.9 % (ref 11–15)
GLOBULIN PLAS-MCNC: 3.2 G/DL (ref 2.8–4.4)
GLUCOSE BLD-MCNC: 196 MG/DL (ref 70–99)
GLUCOSE BLDC GLUCOMTR-MCNC: 192 MG/DL (ref 70–99)
HCT VFR BLD AUTO: 41.7 %
HGB BLD-MCNC: 14.7 G/DL
IMM GRANULOCYTES # BLD AUTO: 0.02 X10(3) UL (ref 0–1)
IMM GRANULOCYTES NFR BLD: 0.3 %
LYMPHOCYTES # BLD AUTO: 2.54 X10(3) UL (ref 1–4)
LYMPHOCYTES NFR BLD AUTO: 32.9 %
MCH RBC QN AUTO: 29.3 PG (ref 26–34)
MCHC RBC AUTO-ENTMCNC: 35.3 G/DL (ref 31–37)
MCV RBC AUTO: 83.2 FL
MONOCYTES # BLD AUTO: 0.56 X10(3) UL (ref 0.1–1)
MONOCYTES NFR BLD AUTO: 7.3 %
NEUTROPHILS # BLD AUTO: 4.39 X10 (3) UL (ref 1.5–7.7)
NEUTROPHILS # BLD AUTO: 4.39 X10(3) UL (ref 1.5–7.7)
NEUTROPHILS NFR BLD AUTO: 56.9 %
OSMOLALITY SERPL CALC.SUM OF ELEC: 294 MOSM/KG (ref 275–295)
P AXIS: 29 DEGREES
P-R INTERVAL: 174 MS
PLATELET # BLD AUTO: 272 10(3)UL (ref 150–450)
POTASSIUM SERPL-SCNC: 3.4 MMOL/L (ref 3.5–5.1)
PROT SERPL-MCNC: 6.9 G/DL (ref 6.4–8.2)
Q-T INTERVAL: 398 MS
QRS DURATION: 90 MS
QTC CALCULATION (BEZET): 441 MS
R AXIS: 13 DEGREES
RBC # BLD AUTO: 5.01 X10(6)UL
SODIUM SERPL-SCNC: 140 MMOL/L (ref 136–145)
T AXIS: 7 DEGREES
TROPONIN I HIGH SENSITIVITY: 5 NG/L
TROPONIN I HIGH SENSITIVITY: 6 NG/L
VENTRICULAR RATE: 74 BPM
WBC # BLD AUTO: 7.7 X10(3) UL (ref 4–11)

## 2023-08-24 PROCEDURE — 93010 ELECTROCARDIOGRAM REPORT: CPT

## 2023-08-24 PROCEDURE — 85025 COMPLETE CBC W/AUTO DIFF WBC: CPT

## 2023-08-24 PROCEDURE — 93005 ELECTROCARDIOGRAM TRACING: CPT

## 2023-08-24 PROCEDURE — 84484 ASSAY OF TROPONIN QUANT: CPT

## 2023-08-24 PROCEDURE — 82962 GLUCOSE BLOOD TEST: CPT

## 2023-08-24 PROCEDURE — 84484 ASSAY OF TROPONIN QUANT: CPT | Performed by: EMERGENCY MEDICINE

## 2023-08-24 PROCEDURE — 80053 COMPREHEN METABOLIC PANEL: CPT

## 2023-08-24 PROCEDURE — 99285 EMERGENCY DEPT VISIT HI MDM: CPT

## 2023-08-24 PROCEDURE — 85379 FIBRIN DEGRADATION QUANT: CPT | Performed by: EMERGENCY MEDICINE

## 2023-08-24 PROCEDURE — 36415 COLL VENOUS BLD VENIPUNCTURE: CPT

## 2023-08-24 PROCEDURE — 99284 EMERGENCY DEPT VISIT MOD MDM: CPT

## 2023-08-24 PROCEDURE — 71045 X-RAY EXAM CHEST 1 VIEW: CPT

## 2023-09-06 ENCOUNTER — TELEPHONE (OUTPATIENT)
Dept: FAMILY MEDICINE CLINIC | Facility: CLINIC | Age: 38
End: 2023-09-06

## 2023-09-06 NOTE — TELEPHONE ENCOUNTER
Disposition and Plan      Clinical Impression:  Chest pain of uncertain etiology  (primary encounter diagnosis)     Disposition:  Discharge     Follow-up:  Tulio Davalos, [place] [facility] [id number] [phone]     Follow up           Medications Prescribed:  Current Discharge Medication List

## 2023-09-15 ENCOUNTER — PATIENT MESSAGE (OUTPATIENT)
Dept: FAMILY MEDICINE CLINIC | Facility: CLINIC | Age: 38
End: 2023-09-15

## 2023-09-15 ENCOUNTER — OFFICE VISIT (OUTPATIENT)
Dept: FAMILY MEDICINE CLINIC | Facility: CLINIC | Age: 38
End: 2023-09-15

## 2023-09-15 VITALS
WEIGHT: 315 LBS | OXYGEN SATURATION: 98 % | HEIGHT: 72 IN | BODY MASS INDEX: 42.66 KG/M2 | DIASTOLIC BLOOD PRESSURE: 84 MMHG | RESPIRATION RATE: 18 BRPM | HEART RATE: 61 BPM | SYSTOLIC BLOOD PRESSURE: 146 MMHG | TEMPERATURE: 98 F

## 2023-09-15 DIAGNOSIS — J30.2 SEASONAL ALLERGIC RHINITIS: ICD-10-CM

## 2023-09-15 DIAGNOSIS — I10 ESSENTIAL HYPERTENSION: Primary | ICD-10-CM

## 2023-09-15 PROCEDURE — 3077F SYST BP >= 140 MM HG: CPT

## 2023-09-15 PROCEDURE — 99213 OFFICE O/P EST LOW 20 MIN: CPT

## 2023-09-15 PROCEDURE — 3008F BODY MASS INDEX DOCD: CPT

## 2023-09-15 PROCEDURE — 3079F DIAST BP 80-89 MM HG: CPT

## 2023-09-15 RX ORDER — LISINOPRIL 10 MG/1
10 TABLET ORAL DAILY
Qty: 90 TABLET | Refills: 1 | Status: SHIPPED | OUTPATIENT
Start: 2023-09-15

## 2023-09-15 RX ORDER — ALBUTEROL SULFATE 90 UG/1
1-2 AEROSOL, METERED RESPIRATORY (INHALATION) EVERY 6 HOURS PRN
Qty: 18 G | Refills: 0 | Status: SHIPPED | OUTPATIENT
Start: 2023-09-15

## 2023-09-22 ENCOUNTER — HOSPITAL ENCOUNTER (OUTPATIENT)
Dept: CV DIAGNOSTICS | Facility: HOSPITAL | Age: 38
Discharge: HOME | End: 2023-09-22
Payer: MEDICAID

## 2023-09-22 DIAGNOSIS — R94.31 ABNORMAL EKG: ICD-10-CM

## 2023-09-22 PROCEDURE — 93306 TTE W/DOPPLER COMPLETE: CPT

## 2023-09-29 ENCOUNTER — TELEPHONE (OUTPATIENT)
Dept: FAMILY MEDICINE CLINIC | Facility: CLINIC | Age: 38
End: 2023-09-29

## 2023-09-29 NOTE — TELEPHONE ENCOUNTER
Patient (name and  verified) calling to schedule an appointment with PCP. 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 They recommended follow up with PCP office. Patient denies hx of diabetes. Noted on last CMP result on 23, Glucose was 196. Assisted patient with appt scheduling, verbalized understanding and agrees to plan.    Future Appointments   Date Time Provider Chio Villa   10/4/2023  4:15 PM EDD Uribe [facility] P[place]

## 2023-10-04 ENCOUNTER — OFFICE VISIT (OUTPATIENT)
Dept: FAMILY MEDICINE CLINIC | Facility: CLINIC | Age: 38
End: 2023-10-04

## 2023-10-04 ENCOUNTER — LAB ENCOUNTER (OUTPATIENT)
Dept: LAB | Facility: HOSPITAL | Age: 38
End: 2023-10-04
Payer: MEDICAID

## 2023-10-04 VITALS
RESPIRATION RATE: 21 BRPM | WEIGHT: 315 LBS | SYSTOLIC BLOOD PRESSURE: 140 MMHG | OXYGEN SATURATION: 97 % | DIASTOLIC BLOOD PRESSURE: 98 MMHG | BODY MASS INDEX: 42.66 KG/M2 | HEIGHT: 72 IN | HEART RATE: 104 BPM

## 2023-10-04 DIAGNOSIS — R06.83 SNORING: Primary | ICD-10-CM

## 2023-10-04 DIAGNOSIS — I10 ESSENTIAL HYPERTENSION: ICD-10-CM

## 2023-10-04 DIAGNOSIS — E11.65 TYPE 2 DIABETES MELLITUS WITH HYPERGLYCEMIA, WITHOUT LONG-TERM CURRENT USE OF INSULIN (HCC): ICD-10-CM

## 2023-10-04 LAB
ALBUMIN SERPL-MCNC: 4.1 G/DL (ref 3.4–5)
ALBUMIN/GLOB SERPL: 1.2 {RATIO} (ref 1–2)
ALP LIVER SERPL-CCNC: 67 U/L
ALT SERPL-CCNC: 21 U/L
ANION GAP SERPL CALC-SCNC: 5 MMOL/L (ref 0–18)
AST SERPL-CCNC: 20 U/L (ref 15–37)
BILIRUB SERPL-MCNC: 0.5 MG/DL (ref 0.1–2)
BUN BLD-MCNC: 13 MG/DL (ref 7–18)
BUN/CREAT SERPL: 11.4 (ref 10–20)
CALCIUM BLD-MCNC: 9.2 MG/DL (ref 8.5–10.1)
CHLORIDE SERPL-SCNC: 106 MMOL/L (ref 98–112)
CHOLEST SERPL-MCNC: 132 MG/DL (ref ?–200)
CO2 SERPL-SCNC: 29 MMOL/L (ref 21–32)
CREAT BLD-MCNC: 1.14 MG/DL
CREAT UR-SCNC: 199 MG/DL
EGFRCR SERPLBLD CKD-EPI 2021: 85 ML/MIN/1.73M2 (ref 60–?)
EST. AVERAGE GLUCOSE BLD GHB EST-MCNC: 140 MG/DL (ref 68–126)
FASTING PATIENT LIPID ANSWER: NO
FASTING STATUS PATIENT QL REPORTED: NO
GLOBULIN PLAS-MCNC: 3.5 G/DL (ref 2.8–4.4)
GLUCOSE BLD-MCNC: 109 MG/DL (ref 70–99)
HBA1C MFR BLD: 6.5 % (ref ?–5.7)
HDLC SERPL-MCNC: 33 MG/DL (ref 40–59)
LDLC SERPL CALC-MCNC: 56 MG/DL (ref ?–100)
MICROALBUMIN UR-MCNC: 2 MG/DL
MICROALBUMIN/CREAT 24H UR-RTO: 10.1 UG/MG (ref ?–30)
NONHDLC SERPL-MCNC: 99 MG/DL (ref ?–130)
OSMOLALITY SERPL CALC.SUM OF ELEC: 291 MOSM/KG (ref 275–295)
POTASSIUM SERPL-SCNC: 4.4 MMOL/L (ref 3.5–5.1)
PROT SERPL-MCNC: 7.6 G/DL (ref 6.4–8.2)
SODIUM SERPL-SCNC: 140 MMOL/L (ref 136–145)
TRIGL SERPL-MCNC: 269 MG/DL (ref 30–149)
VLDLC SERPL CALC-MCNC: 39 MG/DL (ref 0–30)

## 2023-10-04 PROCEDURE — 82043 UR ALBUMIN QUANTITATIVE: CPT

## 2023-10-04 PROCEDURE — 3077F SYST BP >= 140 MM HG: CPT

## 2023-10-04 PROCEDURE — 3008F BODY MASS INDEX DOCD: CPT

## 2023-10-04 PROCEDURE — 3080F DIAST BP >= 90 MM HG: CPT

## 2023-10-04 PROCEDURE — 3061F NEG MICROALBUMINURIA REV: CPT

## 2023-10-04 PROCEDURE — 36415 COLL VENOUS BLD VENIPUNCTURE: CPT

## 2023-10-04 PROCEDURE — 83036 HEMOGLOBIN GLYCOSYLATED A1C: CPT

## 2023-10-04 PROCEDURE — 80061 LIPID PANEL: CPT

## 2023-10-04 PROCEDURE — 80053 COMPREHEN METABOLIC PANEL: CPT

## 2023-10-04 PROCEDURE — 99213 OFFICE O/P EST LOW 20 MIN: CPT

## 2023-10-04 PROCEDURE — 82570 ASSAY OF URINE CREATININE: CPT

## 2023-10-04 PROCEDURE — 3044F HG A1C LEVEL LT 7.0%: CPT

## 2023-10-04 RX ORDER — VALSARTAN AND HYDROCHLOROTHIAZIDE 160; 12.5 MG/1; MG/1
1 TABLET, FILM COATED ORAL DAILY
Qty: 30 TABLET | Refills: 0 | Status: SHIPPED | OUTPATIENT
Start: 2023-10-04 | End: 2023-10-13 | Stop reason: DRUGHIGH

## 2023-10-13 ENCOUNTER — OFFICE VISIT (OUTPATIENT)
Dept: FAMILY MEDICINE CLINIC | Facility: CLINIC | Age: 38
End: 2023-10-13

## 2023-10-13 VITALS
RESPIRATION RATE: 20 BRPM | OXYGEN SATURATION: 97 % | BODY MASS INDEX: 42.66 KG/M2 | DIASTOLIC BLOOD PRESSURE: 92 MMHG | HEART RATE: 62 BPM | HEIGHT: 72 IN | SYSTOLIC BLOOD PRESSURE: 144 MMHG | WEIGHT: 315 LBS

## 2023-10-13 DIAGNOSIS — I10 ESSENTIAL HYPERTENSION: Primary | ICD-10-CM

## 2023-10-13 DIAGNOSIS — E11.65 TYPE 2 DIABETES MELLITUS WITH HYPERGLYCEMIA, WITHOUT LONG-TERM CURRENT USE OF INSULIN (HCC): ICD-10-CM

## 2023-10-13 PROCEDURE — 3008F BODY MASS INDEX DOCD: CPT

## 2023-10-13 PROCEDURE — 99213 OFFICE O/P EST LOW 20 MIN: CPT

## 2023-10-13 PROCEDURE — 3080F DIAST BP >= 90 MM HG: CPT

## 2023-10-13 PROCEDURE — 3077F SYST BP >= 140 MM HG: CPT

## 2023-10-13 RX ORDER — VALSARTAN AND HYDROCHLOROTHIAZIDE 320; 25 MG/1; MG/1
1 TABLET, FILM COATED ORAL DAILY
Qty: 30 TABLET | Refills: 0 | Status: SHIPPED | OUTPATIENT
Start: 2023-10-13 | End: 2024-10-07

## 2023-10-24 ENCOUNTER — OFFICE VISIT (OUTPATIENT)
Dept: FAMILY MEDICINE CLINIC | Facility: CLINIC | Age: 38
End: 2023-10-24

## 2023-10-24 VITALS
TEMPERATURE: 98 F | SYSTOLIC BLOOD PRESSURE: 140 MMHG | BODY MASS INDEX: 42.66 KG/M2 | HEART RATE: 82 BPM | OXYGEN SATURATION: 98 % | WEIGHT: 315 LBS | DIASTOLIC BLOOD PRESSURE: 98 MMHG | RESPIRATION RATE: 20 BRPM | HEIGHT: 72 IN

## 2023-10-24 DIAGNOSIS — I10 ESSENTIAL HYPERTENSION: Primary | ICD-10-CM

## 2023-10-24 DIAGNOSIS — N52.2 DRUG-INDUCED ERECTILE DYSFUNCTION: ICD-10-CM

## 2023-10-24 PROCEDURE — 3077F SYST BP >= 140 MM HG: CPT

## 2023-10-24 PROCEDURE — 3008F BODY MASS INDEX DOCD: CPT

## 2023-10-24 PROCEDURE — 99213 OFFICE O/P EST LOW 20 MIN: CPT

## 2023-10-24 PROCEDURE — 3080F DIAST BP >= 90 MM HG: CPT

## 2023-10-24 RX ORDER — GLYCERIN ADULT
SUPPOSITORY, RECTAL RECTAL
Qty: 1 EACH | Refills: 0 | Status: SHIPPED | OUTPATIENT
Start: 2023-10-24

## 2023-10-24 RX ORDER — TADALAFIL 5 MG/1
5 TABLET ORAL
Qty: 10 TABLET | Refills: 2 | Status: SHIPPED | OUTPATIENT
Start: 2023-10-24 | End: 2023-10-31 | Stop reason: ALTCHOICE

## 2023-10-24 RX ORDER — AMLODIPINE BESYLATE 5 MG/1
5 TABLET ORAL DAILY
Qty: 30 TABLET | Refills: 0 | Status: SHIPPED | OUTPATIENT
Start: 2023-10-24 | End: 2023-10-31

## 2023-10-25 ENCOUNTER — TELEPHONE (OUTPATIENT)
Dept: FAMILY MEDICINE CLINIC | Facility: CLINIC | Age: 38
End: 2023-10-25

## 2023-10-26 ENCOUNTER — TELEPHONE (OUTPATIENT)
Dept: FAMILY MEDICINE CLINIC | Facility: CLINIC | Age: 38
End: 2023-10-26

## 2023-10-26 DIAGNOSIS — I10 ESSENTIAL HYPERTENSION, MALIGNANT: Primary | ICD-10-CM

## 2023-10-26 DIAGNOSIS — I10 BENIGN HYPERTENSION: ICD-10-CM

## 2023-10-26 NOTE — TELEPHONE ENCOUNTER
Edu Valdivia from [facility] [address] calling to inform that the patient is at their office, as the patient was not able to get the blood pressure monitor from [facility], so she will need to get order sent to [facility] [address] - fax # [phone].

## 2023-10-26 NOTE — TELEPHONE ENCOUNTER
Inform patient that medication was denied.    Patient given Good Rx card during office visit and instructed to use for lower out of pocket cost

## 2023-10-31 ENCOUNTER — OFFICE VISIT (OUTPATIENT)
Dept: FAMILY MEDICINE CLINIC | Facility: CLINIC | Age: 38
End: 2023-10-31

## 2023-10-31 VITALS
OXYGEN SATURATION: 98 % | TEMPERATURE: 99 F | HEIGHT: 72 IN | WEIGHT: 315 LBS | DIASTOLIC BLOOD PRESSURE: 82 MMHG | HEART RATE: 78 BPM | BODY MASS INDEX: 42.66 KG/M2 | RESPIRATION RATE: 19 BRPM | SYSTOLIC BLOOD PRESSURE: 128 MMHG

## 2023-10-31 DIAGNOSIS — I10 ESSENTIAL HYPERTENSION: Primary | ICD-10-CM

## 2023-10-31 PROCEDURE — 99213 OFFICE O/P EST LOW 20 MIN: CPT

## 2023-10-31 PROCEDURE — 3008F BODY MASS INDEX DOCD: CPT

## 2023-10-31 PROCEDURE — 3074F SYST BP LT 130 MM HG: CPT

## 2023-10-31 PROCEDURE — 3079F DIAST BP 80-89 MM HG: CPT

## 2023-10-31 RX ORDER — VALSARTAN AND HYDROCHLOROTHIAZIDE 320; 25 MG/1; MG/1
1 TABLET, FILM COATED ORAL DAILY
Qty: 90 TABLET | Refills: 1 | Status: SHIPPED | OUTPATIENT
Start: 2023-10-31 | End: 2024-10-25

## 2023-10-31 RX ORDER — AMLODIPINE BESYLATE 5 MG/1
5 TABLET ORAL DAILY
Qty: 90 TABLET | Refills: 1 | Status: SHIPPED | OUTPATIENT
Start: 2023-10-31

## 2023-10-31 RX ORDER — SILDENAFIL 50 MG/1
50 TABLET, FILM COATED ORAL
Qty: 10 TABLET | Refills: 0 | Status: SHIPPED | OUTPATIENT
Start: 2023-10-31

## 2023-11-03 ENCOUNTER — OFFICE VISIT (OUTPATIENT)
Dept: SLEEP CENTER | Age: 38
End: 2023-11-03
Payer: MEDICAID

## 2023-11-03 DIAGNOSIS — R06.83 SNORING: ICD-10-CM

## 2023-11-03 PROCEDURE — 95811 POLYSOM 6/>YRS CPAP 4/> PARM: CPT

## 2023-11-09 ENCOUNTER — TELEPHONE (OUTPATIENT)
Dept: FAMILY MEDICINE CLINIC | Facility: CLINIC | Age: 38
End: 2023-11-09

## 2023-11-09 DIAGNOSIS — G47.33 OBSTRUCTIVE SLEEP APNEA SYNDROME: Primary | ICD-10-CM

## 2023-11-09 NOTE — TELEPHONE ENCOUNTER
----- Message from Mayuri Pena RN sent at 11/9/2023  2:07 PM CST -----      ----- Message -----  From: EDD Peña  Sent: 11/9/2023  11:55 AM CST  To: Em Rn Triage    Sleep study consistent with obstructive sleep apnea. CPAP recommendations listed in diagnostic report. EM RN TRIAGE SUPPORT:   Please prepare CPAP order and APRN will sign.  Thank you

## 2023-11-13 NOTE — TELEPHONE ENCOUNTER
Cpap DME ordered.  Please review, sign & return to triage    [facility] [phone] fax [phone]

## 2023-11-18 ENCOUNTER — OFFICE VISIT (OUTPATIENT)
Dept: FAMILY MEDICINE CLINIC | Facility: CLINIC | Age: 38
End: 2023-11-18

## 2023-11-18 VITALS
HEART RATE: 71 BPM | RESPIRATION RATE: 18 BRPM | OXYGEN SATURATION: 100 % | HEIGHT: 72 IN | BODY MASS INDEX: 42.66 KG/M2 | WEIGHT: 315 LBS | SYSTOLIC BLOOD PRESSURE: 138 MMHG | TEMPERATURE: 98 F | DIASTOLIC BLOOD PRESSURE: 82 MMHG

## 2023-11-18 DIAGNOSIS — E11.65 TYPE 2 DIABETES MELLITUS WITH HYPERGLYCEMIA, WITHOUT LONG-TERM CURRENT USE OF INSULIN (HCC): Primary | ICD-10-CM

## 2023-11-18 DIAGNOSIS — I10 ESSENTIAL HYPERTENSION: ICD-10-CM

## 2023-11-18 DIAGNOSIS — G47.33 OBSTRUCTIVE SLEEP APNEA SYNDROME: ICD-10-CM

## 2023-11-18 PROCEDURE — 3079F DIAST BP 80-89 MM HG: CPT

## 2023-11-18 PROCEDURE — 3008F BODY MASS INDEX DOCD: CPT

## 2023-11-18 PROCEDURE — 3075F SYST BP GE 130 - 139MM HG: CPT

## 2023-11-18 PROCEDURE — 99213 OFFICE O/P EST LOW 20 MIN: CPT

## 2023-11-30 RX ORDER — AMLODIPINE BESYLATE 5 MG/1
5 TABLET ORAL DAILY
Qty: 90 TABLET | Refills: 1 | OUTPATIENT
Start: 2023-11-30

## 2023-11-30 NOTE — TELEPHONE ENCOUNTER
Pharmacy    [id number] - [address], [address] [place] [address] [phone], [phone]      Disp Refills Start End    amLODIPine 5 MG Oral Tab 90 tablet 1 10/31/2023     Sig - Route: Take 1 tablet (5 mg total) by mouth daily. - Oral    Sent to pharmacy as: amLODIPine Besylate 5 MG Oral Tablet (Norvasc)    E-Prescribing Status: Receipt confirmed by pharmacy (10/31/2023 11:10 AM CDT)    [employer] message sent.

## 2023-12-14 ENCOUNTER — TELEPHONE (OUTPATIENT)
Dept: FAMILY MEDICINE CLINIC | Facility: CLINIC | Age: 38
End: 2023-12-14

## 2023-12-14 NOTE — TELEPHONE ENCOUNTER
Spoke to Patient who asked me to send information because he is out of town. I sent [employer]-Files message with all the information.

## 2023-12-29 ENCOUNTER — MED REC SCAN ONLY (OUTPATIENT)
Dept: FAMILY MEDICINE CLINIC | Facility: CLINIC | Age: 38
End: 2023-12-29

## 2024-01-10 ENCOUNTER — TELEPHONE (OUTPATIENT)
Dept: FAMILY MEDICINE CLINIC | Facility: CLINIC | Age: 39
End: 2024-01-10

## 2024-01-10 NOTE — TELEPHONE ENCOUNTER
[facility]  [phone]  F: [phone]    They need the face-to-face notes prior to 11-3-23 baseline sleep study.    They faxed us this request twice already on 12-8-23 and 1-5-24    Lastly they need a good phone number for patient, they are not able to reach them

## 2024-01-10 NOTE — TELEPHONE ENCOUNTER
Left Voicemail  for Danika to call back our office. Office phone number provided with office telephone hours.     Staff-- What exactly does Danika need from our office ??

## 2024-01-12 NOTE — TELEPHONE ENCOUNTER
Faxed note dated 10/4/23 to [facility], confirmation received.     Marbin Bey with home medical contact information for patient.

## 2024-03-11 ENCOUNTER — MED REC SCAN ONLY (OUTPATIENT)
Dept: FAMILY MEDICINE CLINIC | Facility: CLINIC | Age: 39
End: 2024-03-11

## 2024-03-11 ENCOUNTER — TELEPHONE (OUTPATIENT)
Dept: FAMILY MEDICINE CLINIC | Facility: CLINIC | Age: 39
End: 2024-03-11

## 2024-03-11 NOTE — TELEPHONE ENCOUNTER
Please call patient to schedule follow-up regarding blood pressure and CPAP use.   Received fax from DME supplier that patient not using CPAP machine.

## 2024-03-20 ENCOUNTER — OFFICE VISIT (OUTPATIENT)
Dept: FAMILY MEDICINE CLINIC | Facility: CLINIC | Age: 39
End: 2024-03-20

## 2024-03-20 VITALS
BODY MASS INDEX: 42.66 KG/M2 | DIASTOLIC BLOOD PRESSURE: 74 MMHG | HEART RATE: 69 BPM | HEIGHT: 72 IN | SYSTOLIC BLOOD PRESSURE: 133 MMHG | OXYGEN SATURATION: 100 % | TEMPERATURE: 98 F | RESPIRATION RATE: 16 BRPM | WEIGHT: 315 LBS

## 2024-03-20 DIAGNOSIS — I10 ESSENTIAL HYPERTENSION: ICD-10-CM

## 2024-03-20 DIAGNOSIS — G47.33 OBSTRUCTIVE SLEEP APNEA SYNDROME: ICD-10-CM

## 2024-03-20 DIAGNOSIS — E11.65 TYPE 2 DIABETES MELLITUS WITH HYPERGLYCEMIA, WITHOUT LONG-TERM CURRENT USE OF INSULIN (HCC): Primary | ICD-10-CM

## 2024-03-20 DIAGNOSIS — N52.2 DRUG-INDUCED ERECTILE DYSFUNCTION: ICD-10-CM

## 2024-03-20 PROCEDURE — 99213 OFFICE O/P EST LOW 20 MIN: CPT

## 2024-03-20 RX ORDER — SILDENAFIL 50 MG/1
50 TABLET, FILM COATED ORAL
Qty: 10 TABLET | Refills: 0 | Status: SHIPPED | OUTPATIENT
Start: 2024-03-20

## 2024-03-20 RX ORDER — BLOOD SUGAR DIAGNOSTIC
STRIP MISCELLANEOUS
Qty: 100 STRIP | Refills: 2 | Status: SHIPPED | OUTPATIENT
Start: 2024-03-20

## 2024-03-20 RX ORDER — BLOOD-GLUCOSE METER
EACH MISCELLANEOUS
Qty: 1 KIT | Refills: 0 | Status: SHIPPED | OUTPATIENT
Start: 2024-03-20

## 2024-03-20 RX ORDER — LANCETS 33 GAUGE
1 EACH MISCELLANEOUS DAILY
Qty: 100 EACH | Refills: 2 | Status: SHIPPED | OUTPATIENT
Start: 2024-03-20

## 2024-03-20 RX ORDER — TADALAFIL 5 MG/1
TABLET ORAL
COMMUNITY
Start: 2023-12-19 | End: 2024-03-20 | Stop reason: ALTCHOICE

## 2024-03-20 NOTE — PROGRESS NOTES
HPI:    Patient ID: Ghulam Juarez Jr. is a 38 year old male.    HPI     Patient here in office for follow-up.  Blood pressure at arrival was 133/74.  Taking valsartan- hydrochlorothiazide 3 20-25 daily and amlodipine 5 mg daily.  Also taking metformin 500 mg 1 tablet daily.  States he has cut back on carbohydrates and processed sugars.  Requesting order for blood glucose machine.  Also wanted to know if safe to use herbal supplement that contains moringa leaf to help manage blood glucose levels.  Also concerned about taking metformin due to potential kidney complications.    Also has history of obstructive sleep apnea.  States he was previously only using his CPAP 50% of the time due to him having a viral infection.  Has since been using machine 70% of the time for 4-5 hours, uses nasal pillow device.  Reports decreased daytime sleepiness with improved energy.  Requesting copy of CPAP usage report for employer.    Requesting refill of sildenafil 50 mg tablet.    Review of Systems   Constitutional: Negative.    Respiratory: Negative.     Cardiovascular: Negative.    Skin: Negative.    Neurological: Negative.    Psychiatric/Behavioral: Negative.              Current Outpatient Medications   Medication Sig Dispense Refill    Blood Glucose Monitoring Suppl (ACCU-CHEK ANABELA PLUS) w/Device Does not apply Kit Check blood sugar daily 1 kit 0    Glucose Blood (ACCU-CHEK ANABELA PLUS) In Vitro Strip Check blood sugar daily 100 strip 2    Lancets 33G Does not apply Misc 1 each daily. Check blood sugar daily. May substitute 100 each 2    Sildenafil Citrate 50 MG Oral Tab Take 1 tablet (50 mg total) by mouth daily as needed for Erectile Dysfunction. 10 tablet 0    Valsartan-hydroCHLOROthiazide 320-25 MG Oral Tab Take 1 tablet by mouth daily. 90 tablet 1    amLODIPine 5 MG Oral Tab Take 1 tablet (5 mg total) by mouth daily. 90 tablet 1    Blood Pressure Monitoring (BLOOD PRESSURE MONITOR/ARM) Does not apply Device XL arm cuff,  patient is 360 lbs 1 each 0    metFORMIN 500 MG Oral Tab Take 1 tablet (500 mg total) by mouth 2 (two) times daily with meals. 60 tablet 5    albuterol 108 (90 Base) MCG/ACT Inhalation Aero Soln Inhale 1-2 puffs into the lungs every 6 (six) hours as needed for Wheezing. 18 g 0    nabumetone 750 MG Oral Tab       fluticasone propionate 50 MCG/ACT Nasal Suspension 1 spray by Nasal route daily. One spray per each nostril daily. 1 each 2    naproxen (NAPROSYN) 500 MG Oral Tab Take 1 tablet (500 mg total) by mouth 2 (two) times daily as needed. Take for 2 weeks as directed and then as needed. 60 tablet 0    CETIRIZINE 10 MG Oral Tab TAKE ONE TABLET BY MOUTH ONE TIME DAILY  30 tablet 1     Allergies:  Allergies   Allergen Reactions    Nyquil Severe Cold-Flu [Phenyleph-Doxylamine-Dm-Apap] RASH      /74   Pulse 69   Temp 98.2 °F (36.8 °C) (Temporal)   Resp 16   Ht 6' (1.829 m)   Wt (!) 362 lb 8 oz (164.4 kg)   SpO2 100%   BMI 49.16 kg/m²   Body mass index is 49.16 kg/m².  PHYSICAL EXAM:   Physical Exam  Vitals reviewed.   Constitutional:       Appearance: Normal appearance.   Cardiovascular:      Rate and Rhythm: Normal rate and regular rhythm.      Heart sounds: Normal heart sounds. No murmur heard.     No friction rub. No gallop.   Pulmonary:      Effort: Pulmonary effort is normal. No respiratory distress.      Breath sounds: Normal breath sounds. No stridor. No wheezing, rhonchi or rales.   Chest:      Chest wall: No tenderness.   Skin:     General: Skin is warm.      Findings: No rash.   Neurological:      General: No focal deficit present.      Mental Status: He is alert and oriented to person, place, and time.   Psychiatric:         Mood and Affect: Mood normal.         Behavior: Behavior normal.         Thought Content: Thought content normal.         Judgment: Judgment normal.                ASSESSMENT/PLAN:   1. Type 2 diabetes mellitus with hyperglycemia, without long-term current use of insulin  ([facility])  -Labs ordered as listed below, will await results for further recommendations concerning metformin.  Will consider Rybelsus based on lab results.  -Advised patient that APRN will further research use of moringa leaf and diabetes management  - Hemoglobin A1C [E]; Future  - Comp Metabolic Panel (14); Future  - Lipid Panel; Future  - Microalb/Creat Ratio, Random Urine; Future  - Blood Glucose Monitoring Suppl (ACCU-CHEK ANABELA PLUS) w/Device Does not apply Kit; Check blood sugar daily  Dispense: 1 kit; Refill: 0  - Glucose Blood (ACCU-CHEK ANABELA PLUS) In Vitro Strip; Check blood sugar daily  Dispense: 100 strip; Refill: 2  - Lancets 33G Does not apply Misc; 1 each daily. Check blood sugar daily. May substitute  Dispense: 100 each; Refill: 2    2. Obstructive sleep apnea syndrome  -APRN will contact [facility] regarding CPAP usage report    3. Essential hypertension  -Continue present management    4. Drug-induced erectile dysfunction  Sildenafil Citrate 50 MG Oral Tab , Take 1 tablet (50 mg total) by mouth daily as needed for Erectile Dysfunction.       Orders Placed This Encounter   Procedures    Hemoglobin A1C [E]    Comp Metabolic Panel (14)    Lipid Panel    Microalb/Creat Ratio, Random Urine       Meds This Visit:  Requested Prescriptions     Signed Prescriptions Disp Refills    Blood Glucose Monitoring Suppl (ACCU-CHEK ANABELA PLUS) w/Device Does not apply Kit 1 kit 0     Sig: Check blood sugar daily    Glucose Blood (ACCU-CHEK ANABELA PLUS) In Vitro Strip 100 strip 2     Sig: Check blood sugar daily    Lancets 33G Does not apply Misc 100 each 2     Si each daily. Check blood sugar daily. May substitute    Sildenafil Citrate 50 MG Oral Tab 10 tablet 0     Sig: Take 1 tablet (50 mg total) by mouth daily as needed for Erectile Dysfunction.       Imaging & Referrals:  None         ID#[id number]

## 2024-03-20 NOTE — PATIENT INSTRUCTIONS
Video [employer]™  What is Type 2 Diabetes?  Watch this clip to understand what happens within your body when you have type 2 diabetes, and the importance of keeping your blood glucose levels within a healthy range.  To watch the video:  Scan the QR code  Using your mobile device, scan the following code:  OR  Go to the website:  [employer]  Enter the prescription code:  1576E    © 1726-3146 [employer]. All rights reserved. This information is not intended as a substitute for professional medical care. Always follow your healthcare professional's instructions.    Managing Type 2 Diabetes  Type 2 diabetes is a long-term (chronic) condition. Managing diabetes may mean making some tough changes. Yourhealthcare team can help you.  To manage type 2 diabetes, you'll need to balance your medicine with diet and activity. You will also need check your blood sugar. And work with yourhealthcare provider to prevent complications.    Take your medicine  You may take pills or give yourself insulin shots for diabetes. Or you may use both. Take your medicines or give yourself insulin at the right times to help you control your blood sugar. Think about ways that will help you remember to take your medicines the right way every day. Ask your healthcare provider orteam for ideas.  You may only take pills for your diabetes. But this may change. Over time, mostpeople with type 2 diabetes also need insulin.  Eat healthy  A healthy diet helps control the amount of sugar in your blood. It also helps you stay at a healthy weight. Or it helps you lose weight, if if you'reoverweight. Extra weight makes it harder to control diabetes.  Your healthcare team will help you create a plan that works for you. You don'thave to give up all the foods you like. Have meals and snacks with:  Vegetables  Fruits  Lean meats or other healthy proteins  Whole grains  Low- or nonfat dairy products     Be physically  active  Being active helps lower your blood sugar. Activity helps your body use insulinto turn food into energy. It also helps you manage your weight:  Ask your healthcare provider to help you to make an activity program that's right for you. Your program is based on your age, general health, and types of activity you enjoy. Start slow. But aim for at least 150 minutes of exercise or activity each week. Start with 30 minutes a day. Exercise in 10-minute blocks. Don’t let more than 2 days go by without being active.  Check your blood sugar  Checking your own blood sugar may be a regular part of your care. Or you may only need to check your blood sugar from time to time. Your healthcare provider will tell you how to check your blood sugar at home. Checking it tells you if your blood sugar is in your target range. Having your blood sugars within thetarget range means that you are managing your diabetes well.  If your blood sugar levels are too high or too low, your healthcare provider may suggest changes to your diet or activity level. He or she may also adjustyour medicine.  Your healthcare provider may also tell you to check your blood sugar more oftenwhen you are sick.  Take care of yourself  When you have diabetes, you may be more likely to get other health problems. They include foot, eye, heart, nerve, and kidney problems. You can help prevent these problems by controlling your blood sugar and taking good care of yourself. Your healthcare provider, nurse, diabetes educator, and others canhelp you with the following:  Checkups. You should have regular checkups with your healthcare provider. At those visits, you will have a physical exam that includes checking your feet. Your healthcare provider will also check your blood pressure and weight. Take your shoes off before your appointment starts to be sure your feet are checked.  Other exams. You'll also need eye, foot, and dental exams at least once each year.  Lab  tests. You will have blood and urine tests:  Your healthcare provider will check your hemoglobin A1C at least twice a year. This blood test shows how well you have been controlling your blood sugar over 2 to 3 months. The results help your healthcare provider manage your diabetes.    You will also have other lab tests. For example, to check for kidney problems and abnormal cholesterol levels.  Smoking. If you smoke, you will need to quit. Smoking makes it more likely to get complications from diabetes. Ask your healthcare provider about ways to quit. Also don't use e-cigarette, or vaping, products.  Vaccines. Get a yearly flu shot. And ask your healthcare provider about vaccines to prevent pneumonia, shingles, and hepatitis B.  Stress and depression  Most people have challenges throughout their lives. Living with diabetes can increase your stress. Feeling stressed or depressed can actually affect yourblood sugar levels.  Tell your healthcare provider if you are having trouble coping with diabetes.He or she can help or refer you to other providers or programs.  To learn more  Know where you can get help. You can try the following:  Support. Ask family and friends to support your efforts to take care of yourself. Or look for a diabetes support group nearby or on the internet. Check the Connect with Others link on www.diabetes.org.  Counseling. Talk with a , psychologist, psychiatrist, or other counselor.  Information. Contact the American Diabetes Association at 805-450-2373 or www.diabetes.org  Kervin last reviewed this educational content on11/1/2019    © 9382-0641 [employer]. All rights reserved. This information is not intended as a substitute for professional medical care. Always follow yourhealthcare professional's instructions.    Type 2 Diabetes and Food Choices  You make food choices every day. Whole wheat or white bread? A side of French fries or fresh fruit? Eat now or later?  Choices about what, when, and how much you eat affect your blood sugar (glucose), and also your blood pressure and cholesterol. Understanding how food affects blood glucose is the first step in managing diabetes. And following a diabetesmeal plan can help you keep your blood glucose levels on track.   Prevent problems  Having type 2 diabetes means that your body doesn’t control blood glucose well. When blood glucose stays too high for too long, serious health problems can develop. It's important to control your blood glucose through diet, exercise, and medicine. This can delay or prevent kidney,eye, nerve, and heart disease, and other complications of diabetes.   Control carbohydrates  Carbohydrates are foods that have the biggest effect on your blood glucose levels. After you eat carbohydrates, your blood glucose rises. Fruit, sweet foods and drinks, starchy foods (such as bread, potatoes, and rice), and milk and milk products contain carbohydrates. Carbohydrates are important for health. But when you eat too many at once, your blood glucose can go too high. This is even more likely if you don't have or take enough insulin forthat food.   Some carbohydrates may raise blood glucose more than others. These include potatoes, sweets, and white bread. Better choices are less processed foods with more fiber and nutrients. Good options are 100% whole-wheat bread, oatmeal,brown rice, and nonstarchy vegetables.   Learn to use food labels that show added sugar. And try to find healthierchoices, particularly if you are overweight.    Food and medicine  Insulin helps glucose move from the blood into your muscle cells, where it can be used for energy. Some diabetes medicines that are taken by mouth help you make more insulin. Or they help your insulin work more efficiently. So your medicines and food plan have to work together. If you take insulin shots, you need to be very careful to match the amount of carbohydrates you eat  with your insulin dose. If you have too many carbohydrates without adjusting your insulin dose, your blood glucose might become too high. If you have too few carbohydrates, your blood glucose might be too low. Your healthcare provider ora dietitian can help you match your food choices to your medicine.   Have a meal plan  With certain medicines, it's best to eat the same amount of food at the same time every day. That keeps your glucose levels stable. And it helps your medicine work best. Physical activity is an important way to control blood glucose, too. Try to exercise at the same time every day. That way you can build the extra calories you need for exercise into your meal plan. With othermedicines, you may have more choices about how much you eat and when.   If you want to change your medicine to better fit your lifestyle, talk withyour healthcare provider.   Eat smart  You can eat the same foods as everyone else, but you have to carefully watch for certain details. That’s where your diabetes meal plan comes in. A personal meal plan tells you the time of day to eat meals and snacks, the types of food to eat, and how much. Itshould include your favorite foods. And it should focus on these healthy foods:   Whole grains, such as 100% whole-wheat bread, brown rice, and oatmeal  Nonfat or low-fat dairy products, such as nonfat milk and yogurt (but be sure these products don't have sugar added to make up for the fat removed)  Lean meats, poultry, fish, eggs, and dried beans and peas  Foods and drinks with no added sugar  Fruits and vegetables  At first, it may be helpful to use measuring cups and spoons to make sure you’re really eating the amount of food that’s in your plan. You can also use standardized portion sizes on food labels, such as 1 serving of meat being the size of a deck of cards. By checking your blood glucose 1 to 2 hours after eating, you can learn how your food choicesaffect your blood glucose.   To  create a diabetes meal plan or change a plan that’s not working for you, see a dietitian or diabetes educator. Let them know if you have any new health concerns or if your medicines have changed. Having ameal plan that you can live with will keep you at your healthy best.     © 3943-1967 [employer]. All rights reserved. This information is not intended as a substitute for professional medical care. Always follow yourhealthcare professional's instructions.    Diabetes: Caring for Your Body   When you have diabetes, your body needs special care. This care helps you stay healthy and prevent problems. Exercise and healthy eating are a part of this. You can also protect yourself by taking special care of your feet, skin, teeth,and eyes.   Caring for your feet     Follow these tips to help keep your feet healthy:  Check your feet every day for redness, blisters, cracks, dry skin, or numbness. Use a mirror to check the bottoms of your feet, if needed. Or ask for help.  Wash your feet in warm (not hot) water. Don’t soak them.  Use an emery board to keep your toenails even with the ends of your toes. File away sharp edges. A foot doctor (podiatrist) may need to cut your toenails for you.  Smooth down calluses gently or wait until your next podiatry appointment.  Keep your skin soft and smooth by putting a thin layer of skin lotion on the tops and bottoms of your feet. Don't put lotion in between your toes.  Always wear shoes or slippers, even inside your home. Make sure that shoes are correctly fitted, not too tight and not too loose. This can cause friction and rubbing of your feet. Change your socks daily. Always check shoes for foreign objects before putting them on.  Call your healthcare provider right away if your feet are numb or painful. Also call your provider if a cut or sore doesn’t heal in a few days.  Preventing skin infections   To prevent skin infections, bathe every day, but use a moderate water  temperature.. Dry yourself well, especially between your toes. Try to keep your home on the humid side during the colder months of the year to prevent your skin getting dry. Wash any cuts with warm, soapy water. Cover with a sterile bandage. Call your healthcare provider if a cut or sore doesn't heal in a fewdays, feels warm, itches, is swollen, or has a bad smell.   Caring for your teeth   Follow these guidelines for healthy teeth:  Brush your teeth twice daily.  Floss your teeth daily.  See your dentist at least twice yearly.  Keep your blood sugar in a good range.  Caring for your eyes  Have your eyes checked every year by an optometrist or ophthalmologist. Letyo healthcare provider know if you experience any of the following symptoms:   Blurred vision  Dark spots or \"holes\"  Flashes of light  Seeing more floaters than usual  Poor night vision  If you smoke, quit  Smoking is dangerous for everyone, especially people with diabetes. It can harm the blood vessels in your eyes, kidneys, nerves, and heart. It raises blood pressure. Smoking can also slow healing, so infections are more likely. Askyour healthcare provider about programs to help you stop smoking.   [employer] last reviewed this educational content on12/1/2021 © 2000-2022 [employer]. All rights reserved. This information is not intended as a substitute for professional medical care. Always follow yourhealthcare professional's instructions.      Controlling High Blood Pressure   High blood pressure (hypertension) is often called the silent killer. This is because many people who have it, don’t know it. It can be very dangerous. High blood pressure can raise your risk of heart attack, stroke, heart disease, and heart failure. Controlling your blood pressure can lower your risk of these problems. It's important to check yourblood pressure regularly. It can save your life.   Blood pressure measurements are given as 2 numbers. Systolic blood  pressure is the upper number. This is the pressure when the heart contracts. Diastolic blood pressure is the lower number. This is the pressure when the heartrelaxes between beats.   Blood pressure is grouped like this:   Normal blood pressure. This is systolic of less than 120 and diastolic of less than 80 (120/80).  Elevated blood pressure.  This is systolic of 120 to 129 and diastolic less than 80.  Stage 1 high blood pressure.  This is systolic of 130 to 139 or diastolic between 80 to 89.  Stage 2 high blood pressure.  This is systolic of 140 or higher or diastolic of 90 or higher.  A heart-healthy lifestyle can help you control your blood pressure withoutmedicines. Below are some things you can do to have a heart-healthy lifestyle.     Eat heart-healthy foods   Choose low-salt, low-fat foods. Limit your sodium to 2,300 mg per day or the amount advised by your healthcare provider.  Limit canned, dried, cured, packaged, and fast foods. These can contain a lot of salt.  Eat 8 to 10 servings of fruits and vegetables every day.  Choose lean meats, fish, or chicken.  Eat whole-grain pasta, brown rice, and beans.  Eat 2 to 3 servings of low-fat or fat-free dairy products.  Ask your doctor about the DASH eating plan. This plan helps reduce blood pressure.  When you go to a restaurant, ask that your meal be made with no added salt.    Stay at a healthy weight   Ask your healthcare provider how many calories to eat a day. Then stick to that number.  Ask your provider what weight range is healthiest for you. If you are overweight, a weight loss of only 3% to 5% of your body weight can help lower blood pressure. A good weight loss goal is to lose 10% of your body weight in a year.  Limit snacks and sweets.  Get regular exercise.    Get more active   Find activities you enjoy. They can be done alone or with friends or family. Try bicycling, dancing, walking, or jogging.  Park farther away from building entrances to walk  more.  Use stairs instead of the elevator.  When you can, walk or bike instead of driving.  [place] leaves, garden, or do household repairs.  Be active at a moderate to vigorous level of physical activity for at least 30 minutes a day for at least 5 days a week.     Manage stress   Make time to relax and enjoy life. Find time to laugh.  Talk about your concerns with your loved ones and your healthcare provider.  Visit with family and friends, and keep up with hobbies.    Limit alcohol and quit smoking   Men should have no more than 2 drinks per day.  Women should have no more than 1 drink per day.  If you smoke, make a plan to stop. Talk with your healthcare provider for help. Smoking greatly raises your risk for heart disease and stroke. Ask your provider about stop-smoking programs and other support.    Blood pressure medicines  If your lifestyle changes aren’t enough, your healthcare provider may prescribe high blood pressure medicine. Take all medicines as prescribed. If you have any questions about yourmedicines, ask your provider before stopping or changing them.   [employer] last reviewed this educational content on12/1/2021 © 2000-2022 [employer]. All rights reserved. This information is not intended as a substitute for professional medical care. Always follow [facility]'s instruction    Video [employer]  What is High Blood Pressure?  Understand what blood pressure is, the health risks of having high blood pressure, the factors that put you at risk for having high blood pressure, and the importance of working with your healthcare provider to control it.  To watch the video:  Scan the QR code  Using your mobile device, scan the following code:  OR  Go to the website:  [employer]  Enter the prescription code:  3414E    © 9414-7984 [employer]. All rights reserved. This information is not intended as a substitute for professional medical care.  Always follow your healthcare professional's instructions.    Video HealthSheets™  Eating Well with High Blood Pressure  Certain foods can make your blood pressure go too high. Watch and learn how easy it is to have delicious meals without harming your health.     To watch the video:  Scan the QR code  Using your mobile device, scan the following code:  OR  Go to the website:  www.[employer]  Enter the prescription code:   QIX       © 0108-2736 [employer]. All rights reserved. This information is not intended as a substitute for professional medical care. Always follow your healthcare professional's instructions.    Taking Your Blood Pressure  Blood pressure is the force of blood against the artery wall as it moves from the heart through the blood vessels. You can take your own blood pressure reading using a digital monitor. Take your readings the same each time, usingthe same arm. Take readings as often as your healthcare provider advises.   About blood pressure monitors  Blood pressure monitors are designed for certain ages and cases. You can find monitors for older adults, for pregnant women, and for children. Make sure theone you choose is the right one for your age and situation.   Experts advise an automatic cuff monitor that fits on your upper arm (bicep). The cuff should fit your arm size. A cuff that’s too large or too small won't give an accurate reading. Measure around yourupper arm to find your size.   Monitors that attach to your finger or wrist are not as accurate as monitorsfor your upper arm.   Ask your healthcare provider for help in choosing a monitor. Bring your monitorto your next provider visit if you need help in using it the correct way.   The steps below are general instructions for using an automatic digitalmonitor.   Step 1. Relax    Take your blood pressure at the same time every day, such as in the morning or evening. Or take it at the time your healthcare provider  advises.  Wait at least 30 minutes after smoking, eating, or exercising. Don't drink coffee, tea, soda, or other caffeinated drinks before checking your blood pressure. Use the restroom beforehand.  Sit comfortably at a table with both feet on the floor. Don't cross your legs or feet. Place the monitor near you.  Rest for at least 5 minutes before you begin. Make sure there are no distractions. This includes TV, cell phones, and other electronics. Wait to have conversations with others until after you measure you blood pressure.  Step 2. Wrap the cuff    Place your arm on the table, palm up. Your arm should be at the level of your heart. Wrap the cuff around your upper arm, just above your elbow. It’s best done on bare skin, not over clothing. Most cuffs will show you where the blood vessel in the middle of the arm at the inner side of the elbow (the brachial artery) should line up with the cuff. Look in your monitor's instruction booklet for an illustration. You can also bring your cuff to your healthcare provider and have them show you how to correctly place the cuff.  Step 3. Inflate the cuff    Push the button that starts the pump.  The cuff will tighten, then loosen.  The numbers will change. When they stop changing, your blood pressure reading will appear.  Take 2 or 3 readings 1 minute apart, or as advised by your provider.  Step 4. Write down the results of each reading    Write down your blood pressure numbers for each reading. Note the date and time. Keep your results in 1 place, such as a notebook. Even if your monitor has a built-in memory, keep a hard copy of the readings.  Remove the cuff from your arm. Turn off the machine.  Bring your blood pressure records with you to each provider visit.  If you start a new blood pressure medicine, note the day you started the new medicine. Also note the day if you change the dose of your medicine. Measure your blood pressure before your take your medicine. This  information goes on your blood pressure recording sheet. This will help your provider check how well the medicine changes are working.  Ask your provider what numbers mean that you should call them. Also ask what numbers mean that you should get help right away.  Kervin last reviewed this educational content on12/1/2021    © 1190-2688 [employer]. All rights reserved. This information is not intended as a substitute for professional medical care. Always follow yourhealthcare professional's instructions.

## 2024-03-23 ENCOUNTER — LAB ENCOUNTER (OUTPATIENT)
Dept: LAB | Age: 39
End: 2024-03-23
Payer: MEDICAID

## 2024-03-23 DIAGNOSIS — E11.65 TYPE 2 DIABETES MELLITUS WITH HYPERGLYCEMIA, WITHOUT LONG-TERM CURRENT USE OF INSULIN (HCC): ICD-10-CM

## 2024-03-23 LAB
ALBUMIN SERPL-MCNC: 4.6 G/DL (ref 3.2–4.8)
ALBUMIN/GLOB SERPL: 1.7 {RATIO} (ref 1–2)
ALP LIVER SERPL-CCNC: 57 U/L
ALT SERPL-CCNC: 9 U/L
ANION GAP SERPL CALC-SCNC: 4 MMOL/L (ref 0–18)
AST SERPL-CCNC: 18 U/L (ref ?–34)
BILIRUB SERPL-MCNC: 0.6 MG/DL (ref 0.3–1.2)
BUN BLD-MCNC: 15 MG/DL (ref 9–23)
BUN/CREAT SERPL: 14.6 (ref 10–20)
CALCIUM BLD-MCNC: 9.4 MG/DL (ref 8.7–10.4)
CHLORIDE SERPL-SCNC: 106 MMOL/L (ref 98–112)
CHOLEST SERPL-MCNC: 120 MG/DL (ref ?–200)
CO2 SERPL-SCNC: 29 MMOL/L (ref 21–32)
CREAT BLD-MCNC: 1.03 MG/DL
CREAT UR-SCNC: 135.8 MG/DL
EGFRCR SERPLBLD CKD-EPI 2021: 97 ML/MIN/1.73M2 (ref 60–?)
EST. AVERAGE GLUCOSE BLD GHB EST-MCNC: 123 MG/DL (ref 68–126)
FASTING PATIENT LIPID ANSWER: YES
FASTING STATUS PATIENT QL REPORTED: YES
GLOBULIN PLAS-MCNC: 2.7 G/DL (ref 2.8–4.4)
GLUCOSE BLD-MCNC: 120 MG/DL (ref 70–99)
HBA1C MFR BLD: 5.9 % (ref ?–5.7)
HDLC SERPL-MCNC: 37 MG/DL (ref 40–59)
LDLC SERPL CALC-MCNC: 59 MG/DL (ref ?–100)
MICROALBUMIN UR-MCNC: <0.3 MG/DL
NONHDLC SERPL-MCNC: 83 MG/DL (ref ?–130)
OSMOLALITY SERPL CALC.SUM OF ELEC: 290 MOSM/KG (ref 275–295)
POTASSIUM SERPL-SCNC: 4.1 MMOL/L (ref 3.5–5.1)
PROT SERPL-MCNC: 7.3 G/DL (ref 5.7–8.2)
SODIUM SERPL-SCNC: 139 MMOL/L (ref 136–145)
TRIGL SERPL-MCNC: 134 MG/DL (ref 30–149)
VLDLC SERPL CALC-MCNC: 20 MG/DL (ref 0–30)

## 2024-03-23 PROCEDURE — 83036 HEMOGLOBIN GLYCOSYLATED A1C: CPT

## 2024-03-23 PROCEDURE — 80053 COMPREHEN METABOLIC PANEL: CPT

## 2024-03-23 PROCEDURE — 82043 UR ALBUMIN QUANTITATIVE: CPT

## 2024-03-23 PROCEDURE — 36415 COLL VENOUS BLD VENIPUNCTURE: CPT

## 2024-03-23 PROCEDURE — 82570 ASSAY OF URINE CREATININE: CPT

## 2024-03-23 PROCEDURE — 80061 LIPID PANEL: CPT

## 2024-04-01 NOTE — TELEPHONE ENCOUNTER
Per Lab results 03/23/24:    Renee Waite, APRN  3/25/2024  6:19 PM CDT       Hemoglobin A1c 5.9, improved diabetes (level currently in prediabetic range).  Patient can hold metformin 500 mg 1 tablet twice daily and take dietary supplement discussed during office visit. Monitor blood sugars closely, if fasting sugars in the morning persistently over 120 mg/dl, needs to restart Metformin.       Requested Prescriptions     Pending Prescriptions Disp Refills    METFORMIN 500 MG Oral Tab [Pharmacy Med Name: Metformin Hydrochloride 500 Mg Tab Moe] 60 tablet 0     Sig: Take 1 tablet (500 mg total) by mouth 2 (two) times daily with meals.

## 2024-04-03 ENCOUNTER — MED REC SCAN ONLY (OUTPATIENT)
Dept: FAMILY MEDICINE CLINIC | Facility: CLINIC | Age: 39
End: 2024-04-03

## 2024-04-29 RX ORDER — AMLODIPINE BESYLATE 5 MG/1
5 TABLET ORAL DAILY
Qty: 90 TABLET | Refills: 3 | Status: SHIPPED | OUTPATIENT
Start: 2024-04-29

## 2024-04-29 RX ORDER — VALSARTAN AND HYDROCHLOROTHIAZIDE 320; 25 MG/1; MG/1
1 TABLET, FILM COATED ORAL DAILY
Qty: 90 TABLET | Refills: 3 | Status: SHIPPED | OUTPATIENT
Start: 2024-04-29

## 2024-04-29 NOTE — TELEPHONE ENCOUNTER
Refill passed per [facility] protocol.  Requested Prescriptions   Pending Prescriptions Disp Refills    VALSARTAN-HYDROCHLOROTHIAZIDE 320-25 MG Oral Tab [Pharmacy Med Name: Valsartan/Hydrochlorothiazide 320-25 Mg Tab [facility]] 90 tablet 0     Sig: TAKE ONE TABLET BY MOUTH DAILY       Hypertension Medications Protocol Passed - 4/29/2024 11:26 AM        Passed - CMP or BMP in past 12 months        Passed - Last BP reading less than 140/90     BP Readings from Last 1 Encounters:   03/20/24 133/74               Passed - In person appointment or virtual visit in the past 12 mos or appointment in next 3 mos     Recent Outpatient Visits              1 month ago Type 2 diabetes mellitus with hyperglycemia, without long-term current use of insulin ([facility])    [facility] [address], APRN    Office Visit    5 months ago Type 2 diabetes mellitus with hyperglycemia, without long-term current use of insulin ([facility])    [facility] [place] [place]    Office Visit    5 months ago Snoring    [facility]    Office Visit    6 months ago Essential hypertension    [facility] [address] Jennifer, APRN    Office Visit    6 months ago Essential hypertension    [facility] [address] [place] APRN    Office Visit                      Passed - EGFRCR or GFRNAA > 50     GFR Evaluation  EGFRCR: 97 , resulted on 3/23/2024             Recent Outpatient Visits              1 month ago Type 2 diabetes mellitus with hyperglycemia, without long-term current use of insulin ([facility])    [facility] [address] Jennifer, APRN    Office Visit    5 months ago Type 2 diabetes mellitus with hyperglycemia, without long-term current use of insulin ([facility])    [facility] [address]  EDD Duran    Office Visit    5 months ago Snoring    [facility]    Office Visit    6 months ago Essential hypertension    [facility], [address], [place] Renee Waite APRN    Office Visit    6 months ago Essential hypertension    [facility], [address], [place] Renee Waite APRN    Office Visit

## 2024-04-29 NOTE — TELEPHONE ENCOUNTER
Patient called; out of Valsartan soon.  Urgent.   Verified pharmacy:    [id number] - [place], [place] - [address] [phone], [phone]   [address] [address]   Phone: [phone] Fax: [phone]

## 2024-04-29 NOTE — TELEPHONE ENCOUNTER
Patient nearly out of Rx's:    Metformin 500mg  Amlodipine 5mg    Send to verified pharmacy:    [id number] - [place], [place] - [address] [phone], [phone]   [address] [address]   Phone: [phone] Fax: [phone]

## 2024-04-29 NOTE — TELEPHONE ENCOUNTER
Refill passed per [facility] protocols.    Requested Prescriptions   Pending Prescriptions Disp Refills    metFORMIN 500 MG Oral Tab 180 tablet 3     Sig: Take 1 tablet (500 mg total) by mouth 2 (two) times daily with meals.       Diabetes Medication Protocol Passed - 4/29/2024 11:42 AM        Passed - Last A1C < 7.5 and within past 6 months     Lab Results   Component Value Date    A1C 5.9 (H) 03/23/2024             Passed - In person appointment or virtual visit in the past 6 mos or appointment in next 3 mos     Recent Outpatient Visits              1 month ago Type 2 diabetes mellitus with hyperglycemia, without long-term current use of insulin ([facility])    [facility], [address], [place]    Office Visit    5 months ago Type 2 diabetes mellitus with hyperglycemia, without long-term current use of insulin ([facility])    [facility], [address], [place]    Office Visit    5 months ago Snoring    [facility]    Office Visit    6 months ago Essential hypertension    [facility] [place] [address], [place] APR[place]    Office Visit    6 months ago Essential hypertension    [facility] [address], [place], EDD    Office Visit                      Passed - Microalbumin procedure in past 12 months or taking ACE/ARB        Passed - EGFRCR or GFRNAA > 50     GFR Evaluation  EGFRCR: 97 , resulted on 3/23/2024          Passed - GFR in the past 12 months          amLODIPine 5 MG Oral Tab 90 tablet 1     Sig: Take 1 tablet (5 mg total) by mouth daily.       Hypertension Medications Protocol Passed - 4/29/2024 11:42 AM        Passed - CMP or BMP in past 12 months        Passed - Last BP reading less than 140/90     BP Readings from Last 1 Encounters:   03/20/24 133/74               Passed - In person appointment or virtual visit in the past 12 mos  or appointment in next 3 mos     Recent Outpatient Visits              1 month ago Type 2 diabetes mellitus with hyperglycemia, without long-term current use of insulin ([facility]    Office Visit    5 months ago Type 2 diabetes mellitus with hyperglycemia, without long-term current use of insulin ([facility])    [facility]    Office Visit    5 months ago Snoring    [facility]    Office Visit    6 months ago Essential hypertension    [facility]    Office Visit    6 months ago Essential hypertension    [facility]    Office Visit                      Passed - EGFRCR or GFRNAA > 50     GFR Evaluation  EGFRCR: 97 , resulted on 3/23/2024

## 2024-06-04 DIAGNOSIS — I10 ESSENTIAL HYPERTENSION: ICD-10-CM

## 2024-06-04 DIAGNOSIS — N52.2 DRUG-INDUCED ERECTILE DYSFUNCTION: ICD-10-CM

## 2024-06-07 RX ORDER — TADALAFIL 5 MG/1
5 TABLET ORAL
Qty: 10 TABLET | Refills: 2 | OUTPATIENT
Start: 2024-06-07

## 2025-01-20 DIAGNOSIS — E11.65 TYPE 2 DIABETES MELLITUS WITH HYPERGLYCEMIA, WITHOUT LONG-TERM CURRENT USE OF INSULIN (HCC): ICD-10-CM

## 2025-01-23 RX ORDER — BLOOD SUGAR DIAGNOSTIC
STRIP MISCELLANEOUS
Qty: 100 STRIP | Refills: 2 | Status: SHIPPED | OUTPATIENT
Start: 2025-01-23

## 2025-01-23 NOTE — TELEPHONE ENCOUNTER
Refill Per Protocol     Requested Prescriptions   Pending Prescriptions Disp Refills    ONETOUCH ULTRA TEST In Vitro Strip [Pharmacy Med Name: Onetouch Ultra [facility]] 100 strip 0     Sig: CHECK BLOOD SUGAR ONCE DAILY       Diabetic Supplies Protocol Passed - 1/23/2025  9:58 AM        Passed - In person appointment or virtual visit in the past 12 mos or appointment in next 3 mos     Recent Outpatient Visits              10 months ago Type 2 diabetes mellitus with hyperglycemia, without long-term current use of insulin ([facility])    [facility] [place]    Office Visit    1 year ago Type 2 diabetes mellitus with hyperglycemia, without long-term current use of insulin ([facility])    [facility] [place] [facility]    Office Visit    1 year ago Snoring    [facility]    Office Visit    1 year ago Essential hypertension    [facility], [address] Jennifer, APRN    Office Visit    1 year ago Essential hypertension    [facility], [address] [place] APR[place]    Office Visit                      Passed - Medication is active on med list                 Recent Outpatient Visits              10 months ago Type 2 diabetes mellitus with hyperglycemia, without long-term current use of insulin ([facility])    [facility]    Office Visit    1 year ago Type 2 diabetes mellitus with hyperglycemia, without long-term current use of insulin ([facility])    [facility] [place] APRN    Office Visit    1 year ago Snoring    [facility]    Office Visit    1 year ago Essential hypertension    [facility] [place] APR[place]    Office Visit    1 year ago Essential hypertension     [facility], [address], Renee Akers APRN    Office Visit

## 2025-01-27 ENCOUNTER — MED REC SCAN ONLY (OUTPATIENT)
Dept: FAMILY MEDICINE CLINIC | Facility: CLINIC | Age: 40
End: 2025-01-27

## 2025-02-20 ENCOUNTER — APPOINTMENT (OUTPATIENT)
Dept: CT IMAGING | Facility: HOSPITAL | Age: 40
End: 2025-02-20
Attending: EMERGENCY MEDICINE
Payer: MEDICAID

## 2025-02-20 ENCOUNTER — HOSPITAL ENCOUNTER (OUTPATIENT)
Facility: HOSPITAL | Age: 40
Setting detail: OBSERVATION
Discharge: HOME | End: 2025-02-20
Attending: EMERGENCY MEDICINE | Admitting: HOSPITALIST
Payer: MEDICAID

## 2025-02-20 ENCOUNTER — HOSPITAL ENCOUNTER (OUTPATIENT)
Facility: HOSPITAL | Age: 40
Discharge: HOME | End: 2025-02-20
Attending: EMERGENCY MEDICINE | Admitting: HOSPITALIST
Payer: MEDICAID

## 2025-02-20 ENCOUNTER — ANESTHESIA EVENT (OUTPATIENT)
Dept: SURGERY | Facility: HOSPITAL | Age: 40
End: 2025-02-20
Payer: MEDICAID

## 2025-02-20 ENCOUNTER — ANESTHESIA (OUTPATIENT)
Dept: SURGERY | Facility: HOSPITAL | Age: 40
End: 2025-02-20
Payer: MEDICAID

## 2025-02-20 VITALS
DIASTOLIC BLOOD PRESSURE: 60 MMHG | HEART RATE: 65 BPM | TEMPERATURE: 97 F | RESPIRATION RATE: 14 BRPM | BODY MASS INDEX: 40.43 KG/M2 | OXYGEN SATURATION: 97 % | SYSTOLIC BLOOD PRESSURE: 140 MMHG | HEIGHT: 74 IN | WEIGHT: 315 LBS

## 2025-02-20 DIAGNOSIS — K35.30 ACUTE APPENDICITIS WITH LOCALIZED PERITONITIS, WITHOUT PERFORATION, ABSCESS, OR GANGRENE: Primary | ICD-10-CM

## 2025-02-20 LAB
ALBUMIN SERPL-MCNC: 4.9 G/DL (ref 3.2–4.8)
ALBUMIN/GLOB SERPL: 1.9 {RATIO} (ref 1–2)
ALP LIVER SERPL-CCNC: 53 U/L
ALT SERPL-CCNC: 9 U/L
ANION GAP SERPL CALC-SCNC: 8 MMOL/L (ref 0–18)
AST SERPL-CCNC: 18 U/L (ref ?–34)
BASOPHILS # BLD AUTO: 0.02 X10(3) UL (ref 0–0.2)
BASOPHILS NFR BLD AUTO: 0.2 %
BILIRUB SERPL-MCNC: 0.6 MG/DL (ref 0.3–1.2)
BILIRUB UR QL: NEGATIVE
BUN BLD-MCNC: 15 MG/DL (ref 9–23)
BUN/CREAT SERPL: 13.6 (ref 10–20)
CALCIUM BLD-MCNC: 9.5 MG/DL (ref 8.7–10.4)
CHLORIDE SERPL-SCNC: 99 MMOL/L (ref 98–112)
CLARITY UR: CLEAR
CO2 SERPL-SCNC: 30 MMOL/L (ref 21–32)
CREAT BLD-MCNC: 1.1 MG/DL
DEPRECATED RDW RBC AUTO: 37.4 FL (ref 35.1–46.3)
EGFRCR SERPLBLD CKD-EPI 2021: 89 ML/MIN/1.73M2 (ref 60–?)
EOSINOPHIL # BLD AUTO: 0.09 X10(3) UL (ref 0–0.7)
EOSINOPHIL NFR BLD AUTO: 1 %
ERYTHROCYTE [DISTWIDTH] IN BLOOD BY AUTOMATED COUNT: 11.9 % (ref 11–15)
GLOBULIN PLAS-MCNC: 2.6 G/DL (ref 2–3.5)
GLUCOSE BLD-MCNC: 161 MG/DL (ref 70–99)
GLUCOSE UR-MCNC: >1000 MG/DL
HCT VFR BLD AUTO: 43.8 %
HGB BLD-MCNC: 15 G/DL
HGB UR QL STRIP.AUTO: NEGATIVE
IMM GRANULOCYTES # BLD AUTO: 0.02 X10(3) UL (ref 0–1)
IMM GRANULOCYTES NFR BLD: 0.2 %
KETONES UR-MCNC: NEGATIVE MG/DL
LEUKOCYTE ESTERASE UR QL STRIP.AUTO: NEGATIVE
LIPASE SERPL-CCNC: 40 U/L (ref 12–53)
LYMPHOCYTES # BLD AUTO: 2.36 X10(3) UL (ref 1–4)
LYMPHOCYTES NFR BLD AUTO: 25.7 %
MCH RBC QN AUTO: 29.5 PG (ref 26–34)
MCHC RBC AUTO-ENTMCNC: 34.2 G/DL (ref 31–37)
MCV RBC AUTO: 86.1 FL
MONOCYTES # BLD AUTO: 0.61 X10(3) UL (ref 0.1–1)
MONOCYTES NFR BLD AUTO: 6.6 %
NEUTROPHILS # BLD AUTO: 6.1 X10 (3) UL (ref 1.5–7.7)
NEUTROPHILS # BLD AUTO: 6.1 X10(3) UL (ref 1.5–7.7)
NEUTROPHILS NFR BLD AUTO: 66.3 %
NITRITE UR QL STRIP.AUTO: NEGATIVE
OSMOLALITY SERPL CALC.SUM OF ELEC: 288 MOSM/KG (ref 275–295)
PH UR: 7 [PH] (ref 5–8)
PLATELET # BLD AUTO: 267 10(3)UL (ref 150–450)
POTASSIUM SERPL-SCNC: 4.1 MMOL/L (ref 3.5–5.1)
PROT SERPL-MCNC: 7.5 G/DL (ref 5.7–8.2)
PROT UR-MCNC: NEGATIVE MG/DL
RBC # BLD AUTO: 5.09 X10(6)UL
SODIUM SERPL-SCNC: 137 MMOL/L (ref 136–145)
SP GR UR STRIP: 1.02 (ref 1–1.03)
UROBILINOGEN UR STRIP-ACNC: NORMAL
WBC # BLD AUTO: 9.2 X10(3) UL (ref 4–11)

## 2025-02-20 PROCEDURE — 99222 1ST HOSP IP/OBS MODERATE 55: CPT | Performed by: HOSPITALIST

## 2025-02-20 PROCEDURE — 44970 LAPAROSCOPY APPENDECTOMY: CPT | Performed by: STUDENT IN AN ORGANIZED HEALTH CARE EDUCATION/TRAINING PROGRAM

## 2025-02-20 PROCEDURE — 99223 1ST HOSP IP/OBS HIGH 75: CPT | Performed by: STUDENT IN AN ORGANIZED HEALTH CARE EDUCATION/TRAINING PROGRAM

## 2025-02-20 PROCEDURE — 74177 CT ABD & PELVIS W/CONTRAST: CPT | Performed by: EMERGENCY MEDICINE

## 2025-02-20 PROCEDURE — 0DTJ4ZZ RESECTION OF APPENDIX, PERCUTANEOUS ENDOSCOPIC APPROACH: ICD-10-PCS | Performed by: STUDENT IN AN ORGANIZED HEALTH CARE EDUCATION/TRAINING PROGRAM

## 2025-02-20 RX ORDER — MIDAZOLAM HYDROCHLORIDE 1 MG/ML
INJECTION INTRAMUSCULAR; INTRAVENOUS AS NEEDED
Status: DISCONTINUED | OUTPATIENT
Start: 2025-02-20 | End: 2025-02-20 | Stop reason: SURG

## 2025-02-20 RX ORDER — HYDROMORPHONE HYDROCHLORIDE 1 MG/ML
0.4 INJECTION, SOLUTION INTRAMUSCULAR; INTRAVENOUS; SUBCUTANEOUS EVERY 5 MIN PRN
Status: DISCONTINUED | OUTPATIENT
Start: 2025-02-20 | End: 2025-02-20 | Stop reason: HOSPADM

## 2025-02-20 RX ORDER — VALSARTAN AND HYDROCHLOROTHIAZIDE 320; 25 MG/1; MG/1
1 TABLET, FILM COATED ORAL DAILY
Status: CANCELLED | OUTPATIENT
Start: 2025-02-21

## 2025-02-20 RX ORDER — METRONIDAZOLE 500 MG/100ML
500 INJECTION, SOLUTION INTRAVENOUS EVERY 12 HOURS
OUTPATIENT
Start: 2025-02-20

## 2025-02-20 RX ORDER — SODIUM CHLORIDE 9 MG/ML
INJECTION, SOLUTION INTRAVENOUS CONTINUOUS
OUTPATIENT
Start: 2025-02-20

## 2025-02-20 RX ORDER — MORPHINE SULFATE 10 MG/ML
6 INJECTION, SOLUTION INTRAMUSCULAR; INTRAVENOUS EVERY 10 MIN PRN
Status: DISCONTINUED | OUTPATIENT
Start: 2025-02-20 | End: 2025-02-20 | Stop reason: HOSPADM

## 2025-02-20 RX ORDER — METRONIDAZOLE 500 MG/100ML
500 INJECTION, SOLUTION INTRAVENOUS ONCE
Status: COMPLETED | OUTPATIENT
Start: 2025-02-20 | End: 2025-02-20

## 2025-02-20 RX ORDER — DEXAMETHASONE SODIUM PHOSPHATE 4 MG/ML
VIAL (ML) INJECTION AS NEEDED
Status: DISCONTINUED | OUTPATIENT
Start: 2025-02-20 | End: 2025-02-20 | Stop reason: SURG

## 2025-02-20 RX ORDER — DICYCLOMINE HYDROCHLORIDE 10 MG/5ML
20 SOLUTION ORAL ONCE
Status: COMPLETED | OUTPATIENT
Start: 2025-02-20 | End: 2025-02-20

## 2025-02-20 RX ORDER — OXYCODONE HYDROCHLORIDE 5 MG/1
5 TABLET ORAL ONCE
Status: COMPLETED | OUTPATIENT
Start: 2025-02-20 | End: 2025-02-20

## 2025-02-20 RX ORDER — TEMAZEPAM 15 MG/1
15 CAPSULE ORAL NIGHTLY PRN
OUTPATIENT
Start: 2025-02-20

## 2025-02-20 RX ORDER — MORPHINE SULFATE 2 MG/ML
1 INJECTION, SOLUTION INTRAMUSCULAR; INTRAVENOUS EVERY 2 HOUR PRN
OUTPATIENT
Start: 2025-02-20

## 2025-02-20 RX ORDER — NALOXONE HYDROCHLORIDE 0.4 MG/ML
80 INJECTION, SOLUTION INTRAMUSCULAR; INTRAVENOUS; SUBCUTANEOUS AS NEEDED
Status: DISCONTINUED | OUTPATIENT
Start: 2025-02-20 | End: 2025-02-20 | Stop reason: HOSPADM

## 2025-02-20 RX ORDER — ACETAMINOPHEN 500 MG
1000 TABLET ORAL EVERY 4 HOURS PRN
Qty: 40 TABLET | Refills: 0 | Status: SHIPPED | OUTPATIENT
Start: 2025-02-20

## 2025-02-20 RX ORDER — GLYCOPYRROLATE 0.2 MG/ML
INJECTION, SOLUTION INTRAMUSCULAR; INTRAVENOUS AS NEEDED
Status: DISCONTINUED | OUTPATIENT
Start: 2025-02-20 | End: 2025-02-20 | Stop reason: SURG

## 2025-02-20 RX ORDER — MORPHINE SULFATE 2 MG/ML
2 INJECTION, SOLUTION INTRAMUSCULAR; INTRAVENOUS EVERY 2 HOUR PRN
OUTPATIENT
Start: 2025-02-20

## 2025-02-20 RX ORDER — SODIUM CHLORIDE, SODIUM LACTATE, POTASSIUM CHLORIDE, CALCIUM CHLORIDE 600; 310; 30; 20 MG/100ML; MG/100ML; MG/100ML; MG/100ML
INJECTION, SOLUTION INTRAVENOUS CONTINUOUS
Status: DISCONTINUED | OUTPATIENT
Start: 2025-02-20 | End: 2025-02-20 | Stop reason: HOSPADM

## 2025-02-20 RX ORDER — MORPHINE SULFATE 4 MG/ML
4 INJECTION, SOLUTION INTRAMUSCULAR; INTRAVENOUS EVERY 2 HOUR PRN
OUTPATIENT
Start: 2025-02-20

## 2025-02-20 RX ORDER — OXYCODONE HYDROCHLORIDE 5 MG/1
5 TABLET ORAL EVERY 4 HOURS PRN
Qty: 5 TABLET | Refills: 0 | Status: SHIPPED | OUTPATIENT
Start: 2025-02-20

## 2025-02-20 RX ORDER — ROCURONIUM BROMIDE 10 MG/ML
INJECTION, SOLUTION INTRAVENOUS AS NEEDED
Status: DISCONTINUED | OUTPATIENT
Start: 2025-02-20 | End: 2025-02-20 | Stop reason: SURG

## 2025-02-20 RX ORDER — MORPHINE SULFATE 4 MG/ML
4 INJECTION, SOLUTION INTRAMUSCULAR; INTRAVENOUS EVERY 10 MIN PRN
Status: DISCONTINUED | OUTPATIENT
Start: 2025-02-20 | End: 2025-02-20 | Stop reason: HOSPADM

## 2025-02-20 RX ORDER — MORPHINE SULFATE 4 MG/ML
2 INJECTION, SOLUTION INTRAMUSCULAR; INTRAVENOUS EVERY 10 MIN PRN
Status: DISCONTINUED | OUTPATIENT
Start: 2025-02-20 | End: 2025-02-20 | Stop reason: HOSPADM

## 2025-02-20 RX ORDER — ONDANSETRON 2 MG/ML
4 INJECTION INTRAMUSCULAR; INTRAVENOUS EVERY 6 HOURS PRN
OUTPATIENT
Start: 2025-02-20

## 2025-02-20 RX ORDER — ACETAMINOPHEN 500 MG
500 TABLET ORAL EVERY 4 HOURS PRN
OUTPATIENT
Start: 2025-02-20

## 2025-02-20 RX ORDER — HYDROMORPHONE HYDROCHLORIDE 1 MG/ML
0.6 INJECTION, SOLUTION INTRAMUSCULAR; INTRAVENOUS; SUBCUTANEOUS EVERY 5 MIN PRN
Status: DISCONTINUED | OUTPATIENT
Start: 2025-02-20 | End: 2025-02-20 | Stop reason: HOSPADM

## 2025-02-20 RX ORDER — ONDANSETRON 2 MG/ML
INJECTION INTRAMUSCULAR; INTRAVENOUS AS NEEDED
Status: DISCONTINUED | OUTPATIENT
Start: 2025-02-20 | End: 2025-02-20 | Stop reason: SURG

## 2025-02-20 RX ORDER — NICOTINE POLACRILEX 4 MG
30 LOZENGE BUCCAL
OUTPATIENT
Start: 2025-02-20

## 2025-02-20 RX ORDER — SODIUM CHLORIDE, SODIUM LACTATE, POTASSIUM CHLORIDE, CALCIUM CHLORIDE 600; 310; 30; 20 MG/100ML; MG/100ML; MG/100ML; MG/100ML
INJECTION, SOLUTION INTRAVENOUS CONTINUOUS PRN
Status: DISCONTINUED | OUTPATIENT
Start: 2025-02-20 | End: 2025-02-20 | Stop reason: SURG

## 2025-02-20 RX ORDER — DEXTROSE MONOHYDRATE 25 G/50ML
50 INJECTION, SOLUTION INTRAVENOUS
OUTPATIENT
Start: 2025-02-20

## 2025-02-20 RX ORDER — AMLODIPINE BESYLATE 5 MG/1
5 TABLET ORAL DAILY
Status: CANCELLED | OUTPATIENT
Start: 2025-02-20

## 2025-02-20 RX ORDER — LIDOCAINE HYDROCHLORIDE 10 MG/ML
INJECTION, SOLUTION EPIDURAL; INFILTRATION; INTRACAUDAL; PERINEURAL AS NEEDED
Status: DISCONTINUED | OUTPATIENT
Start: 2025-02-20 | End: 2025-02-20 | Stop reason: SURG

## 2025-02-20 RX ORDER — OMEGA-3 FATTY ACIDS/FISH OIL 300-1000MG
400 CAPSULE ORAL EVERY 6 HOURS PRN
Qty: 40 CAPSULE | Refills: 0 | Status: SHIPPED | OUTPATIENT
Start: 2025-02-20 | End: 2025-03-22

## 2025-02-20 RX ORDER — HYDROMORPHONE HYDROCHLORIDE 1 MG/ML
0.2 INJECTION, SOLUTION INTRAMUSCULAR; INTRAVENOUS; SUBCUTANEOUS EVERY 5 MIN PRN
Status: DISCONTINUED | OUTPATIENT
Start: 2025-02-20 | End: 2025-02-20 | Stop reason: HOSPADM

## 2025-02-20 RX ORDER — PROCHLORPERAZINE EDISYLATE 5 MG/ML
5 INJECTION INTRAMUSCULAR; INTRAVENOUS EVERY 8 HOURS PRN
OUTPATIENT
Start: 2025-02-20

## 2025-02-20 RX ORDER — KETOROLAC TROMETHAMINE 15 MG/ML
15 INJECTION, SOLUTION INTRAMUSCULAR; INTRAVENOUS ONCE
Status: COMPLETED | OUTPATIENT
Start: 2025-02-20 | End: 2025-02-20

## 2025-02-20 RX ORDER — SODIUM CHLORIDE 9 MG/ML
1000 INJECTION, SOLUTION INTRAVENOUS ONCE
Status: COMPLETED | OUTPATIENT
Start: 2025-02-20 | End: 2025-02-20

## 2025-02-20 RX ORDER — NICOTINE POLACRILEX 4 MG
15 LOZENGE BUCCAL
OUTPATIENT
Start: 2025-02-20

## 2025-02-20 RX ORDER — BUPIVACAINE HYDROCHLORIDE AND EPINEPHRINE 2.5; 5 MG/ML; UG/ML
INJECTION, SOLUTION INFILTRATION; PERINEURAL AS NEEDED
Status: DISCONTINUED | OUTPATIENT
Start: 2025-02-20 | End: 2025-02-20 | Stop reason: HOSPADM

## 2025-02-20 RX ADMIN — SODIUM CHLORIDE, SODIUM LACTATE, POTASSIUM CHLORIDE, CALCIUM CHLORIDE: 600; 310; 30; 20 INJECTION, SOLUTION INTRAVENOUS at 14:05:00

## 2025-02-20 RX ADMIN — MIDAZOLAM HYDROCHLORIDE 2 MG: 1 INJECTION INTRAMUSCULAR; INTRAVENOUS at 14:06:00

## 2025-02-20 RX ADMIN — LIDOCAINE HYDROCHLORIDE 100 MG: 10 INJECTION, SOLUTION EPIDURAL; INFILTRATION; INTRACAUDAL; PERINEURAL at 14:11:00

## 2025-02-20 RX ADMIN — GLYCOPYRROLATE 0.2 MG: 0.2 INJECTION, SOLUTION INTRAMUSCULAR; INTRAVENOUS at 14:11:00

## 2025-02-20 RX ADMIN — ONDANSETRON 4 MG: 2 INJECTION INTRAMUSCULAR; INTRAVENOUS at 14:15:00

## 2025-02-20 RX ADMIN — ROCURONIUM BROMIDE 50 MG: 10 INJECTION, SOLUTION INTRAVENOUS at 14:16:00

## 2025-02-20 RX ADMIN — DEXAMETHASONE SODIUM PHOSPHATE 8 MG: 4 MG/ML VIAL (ML) INJECTION at 14:15:00

## 2025-02-20 NOTE — ANESTHESIA PREPROCEDURE EVALUATION
Anesthesia PreOp Note    HPI:     Ghulam Juarez Jr. is a 39 year old male who presents for preoperative consultation requested by: Eri Haley MD    Date of Surgery: 2/20/2025    Procedure(s):  LAPAROSCOPIC APPENDECTOMY  Indication: Acute appendicitis    Relevant Problems   No relevant active problems       NPO:  Last Liquid Consumption Date: 02/19/25  Last Liquid Consumption Time: 0800  Last Solid Consumption Date: 02/19/25  Last Solid Consumption Time: 0800  Last Liquid Consumption Date: 02/19/25          History Review:  Patient Active Problem List    Diagnosis Date Noted    Acute appendicitis with localized peritonitis, without perforation, abscess, or gangrene 02/20/2025    Fracture of distal phalanx of finger 07/29/2023    Laceration of finger 07/29/2023    Class 3 severe obesity due to excess calories with serious comorbidity and body mass index (BMI) of 45.0 to 49.9 in adult ([facility]) 07/14/2020    Mechanical low back pain 07/14/2020    Strain of lumbar region 07/14/2020    Lumbar radiculopathy, acute 07/14/2020    DDD (degenerative disc disease), lumbosacral 07/14/2020    Lumbar foraminal stenosis 07/14/2020    Myalgia 07/14/2020    Morbid obesity with BMI of 45.0-49.9, adult ([facility]) 09/09/2019    Acute right-sided low back pain without sciatica 10/19/2016    Seasonal allergic rhinitis 10/19/2016       Past Medical History:    Asthma ([facility])       History reviewed. No pertinent surgical history.    Prescriptions Prior to Admission[1]  Current Medications and Prescriptions Ordered in Epic[2]    Allergies[3]    History reviewed. No pertinent family history.  Social History     Socioeconomic History    Marital status:    Tobacco Use    Smoking status: Never    Smokeless tobacco: Never   Vaping Use    Vaping status: Never Used   Substance and Sexual Activity    Alcohol use: Not Currently    Drug use: No   Other Topics Concern    Caffeine Concern Yes     Comment: on occasion    Exercise No       Available pre-op  labs reviewed.  Lab Results   Component Value Date    WBC 9.2 02/20/2025    RBC 5.09 02/20/2025    HGB 15.0 02/20/2025    HCT 43.8 02/20/2025    MCV 86.1 02/20/2025    MCH 29.5 02/20/2025    MCHC 34.2 02/20/2025    RDW 11.9 02/20/2025    .0 02/20/2025     Lab Results   Component Value Date     02/20/2025    K 4.1 02/20/2025    CL 99 02/20/2025    CO2 30.0 02/20/2025    BUN 15 02/20/2025    CREATSERUM 1.10 02/20/2025     (H) 02/20/2025    CA 9.5 02/20/2025          Vital Signs:  Body mass index is 47.25 kg/m².   height is 1.88 m (6' 2\") and weight is 166.9 kg (368 lb) (abnormal). His oral temperature is 98 °F (36.7 °C). His blood pressure is 144/80 and his pulse is 71. His respiration is 20 and oxygen saturation is 97%.   Vitals:    02/20/25 1215 02/20/25 1230 02/20/25 1245 02/20/25 1353   BP: 150/77 144/73 146/80 144/80   Pulse: 64 65 62 71   Resp:    20   Temp:    98 °F (36.7 °C)   TempSrc:    Oral   SpO2: 98% 95% 99% 97%   Weight:       Height:            Anesthesia Evaluation     Patient summary reviewed and Nursing notes reviewed    No history of anesthetic complications   Airway   Mallampati: III  Neck ROM: limited  Dental          Pulmonary - normal exam   (+) sleep apnea  Cardiovascular - negative ROS and normal exam    Neuro/Psych - negative ROS     GI/Hepatic/Renal - negative ROS     Endo/Other - negative ROS   Abdominal   (+) obese               Anesthesia Plan:   ASA:  3  Emergent    Plan:   General  Airway:  ETT and Video laryngoscope  Post-op Pain Management: IV analgesics  Plan Comments: Npo times 6 hours will proceed with rapid sequence induction/intubation  Informed Consent Plan and Risks Discussed With:  Patient  Discussed plan with:  CRNA      I have informed Ghulam Juarez Jr. and/or legal guardian or family member of the nature of the anesthetic plan, benefits, risks including possible dental damage if relevant, major complications, and any alternative forms of anesthetic  management.   All of the patient's questions were answered to the best of my ability. The patient desires the anesthetic management as planned.  Roberto Morrissey MD  2/20/2025 2:00 PM  Present on Admission:  **None**         [1] (Not in a hospital admission)  [2]   No current Epic-ordered facility-administered medications on file.     Current Outpatient Medications Ordered in Epic   Medication Sig Dispense Refill    NON FORMULARY Take 1-3 tablets by mouth 3 (three) times daily before meals. Natural OTC medication from [facility] - takes instead of Metformin      Valsartan-hydroCHLOROthiazide 320-25 MG Oral Tab Take 1 tablet by mouth daily. 90 tablet 3    amLODIPine 5 MG Oral Tab Take 1 tablet (5 mg total) by mouth daily. 90 tablet 3    Glucose Blood (ONETOUCH ULTRA TEST) In Vitro Strip CHECK BLOOD SUGAR ONCE DAILY Type 2 diabetes mellitus with hyperglycemia, without long-term current use of insulin ([facility]) E11.9 100 strip 2    Lancets 33G Does not apply Misc 1 each daily. Check blood sugar daily. May substitute 100 each 2   [3]   Allergies  Allergen Reactions    Nyquil Severe Cold-Flu [Phenyleph-Doxylamine-Dm-Apap] RASH

## 2025-02-20 NOTE — CONSULTS
[facility]  Report of Consultation    Ghulam Juarez Jr. Patient Status:  Emergency    1985 MRN [id number]   Location [facility] EMERGENCY DEPARTMENT Attending Ephraim Lama MD   Hosp Day # 0 PCP Jeison Shetty MD       Reason for Consultation:  Acute appendicitis    History of Present Illness:  Ghulam Juarez Jr. is a 39 year old male who presents to [facility] on 2025 with complaints of periumbilical and suprapubic abdominal pain over the past few days. Reports some irritation with urination. Denies fever, chills, nausea, and vomiting. Pain has been worsening over the past few days. Hasn't had a pain like this before. CT A/P revealed acute appendicitis. WBC 9.2, afebrile. Last PO intake was 8am this morning.    Past medical history DM, asthma    Past abdominal surgical history none    History:  Past Medical History:    Asthma (H[facility])     History reviewed. No pertinent surgical history.  History reviewed. No pertinent family history.   reports that he has never smoked. He has never used smokeless tobacco. He reports that he does not currently use alcohol. He reports that he does not use drugs.    Allergies:  Allergies[1]    Medications:  (Not in a hospital admission)        Current Facility-Administered Medications:     metroNIDAZOLE in sodium chloride 0.79% (Flagyl) 5 mg/mL IVPB premix 500 mg, 500 mg, Intravenous, Once    Review of Systems:  Review of Systems   Constitutional:  Negative for chills, diaphoresis and fever.   Respiratory:  Negative for shortness of breath.    Cardiovascular:  Negative for chest pain.   Gastrointestinal:  Positive for abdominal pain. Negative for nausea, vomiting, diarrhea, constipation and abdominal distention.   Genitourinary:  Positive for dysuria.   Neurological:  Negative for weakness.       Physical Exam:  /80   Pulse 62   Temp 98.8 °F (37.1 °C)   Resp 18   Ht 6' 2\" (1.88 m)   Wt (!) 368 lb (166.9 kg)   SpO2 99%    BMI 47.25 kg/m²   Physical Exam  Vitals reviewed.   Constitutional:       General: He is not in acute distress.     Appearance: Normal appearance.   HENT:      Head: Normocephalic and atraumatic.      Right Ear: External ear normal.      Left Ear: External ear normal.      Nose: Nose normal.   Pulmonary:      Effort: Pulmonary effort is normal. No respiratory distress.   Abdominal:      General: There is no distension.      Palpations: Abdomen is soft.      Tenderness: There is abdominal tenderness (periumbilical). There is no guarding or rebound.   Neurological:      Mental Status: He is alert and oriented to person, place, and time.   Psychiatric:         Mood and Affect: Mood normal.         Behavior: Behavior normal.         Thought Content: Thought content normal.         Judgment: Judgment normal.         Laboratory Data:  Recent Labs   Lab 02/20/25  1050   RBC 5.09   HGB 15.0   HCT 43.8   MCV 86.1   MCH 29.5   MCHC 34.2   RDW 11.9   NEPRELIM 6.10   WBC 9.2   .0       Recent Labs   Lab 02/20/25  1050   *   BUN 15   CREATSERUM 1.10   CA 9.5   ALB 4.9*      K 4.1   CL 99   CO2 30.0   ALKPHO 53   AST 18   ALT 9*   BILT 0.6   TP 7.5         No results for input(s): \"PTP\", \"INR\", \"PTT\" in the last 168 hours.      CT ABDOMEN+PELVIS(CONTRAST ONLY)(CPT=74177)    Result Date: 2/20/2025  CONCLUSION:  1. Findings are concerning for acute appendicitis.  Although there is a small focus of gas in the proximal appendix, the appendix is dilated (1.6 cm diameter) and fluid-filled with mucosal hyperemia as well as mild periappendiceal inflammation.  No free intraperitoneal air or well-defined/drainable intra-abdominal collection.  Findings were discussed with Dr. Lama at the time of dictation. 2. Circumferential urinary bladder wall thickening, which may relate to incomplete distention or cystitis.  If there are referable symptoms, urinalysis correlation is requested. 3. Fatty liver with mild-to-moderate  hepatomegaly.  There is also borderline splenomegaly. 4. Lesser incidental findings as above.   elm-remote  Dictated by (CST): Lauri Miranda MD on 2/20/2025 at 11:58 AM     Finalized by (CST): Lauri Miranda MD on 2/20/2025 at 12:03 PM             Medical Decision Making         Impression:  Patient Active Problem List   Diagnosis    Acute right-sided low back pain without sciatica    Seasonal allergic rhinitis    Morbid obesity with BMI of 45.0-49.9, adult ([facility])    Class 3 severe obesity due to excess calories with serious comorbidity and body mass index (BMI) of 45.0 to 49.9 in adult ([facility])    Mechanical low back pain    Strain of lumbar region    Lumbar radiculopathy, acute    DDD (degenerative disc disease), lumbosacral    Lumbar foraminal stenosis    Myalgia    Fracture of distal phalanx of finger    Laceration of finger    Acute appendicitis with localized peritonitis, without perforation, abscess, or gangrene       Acute appendicitis    Plan:  Plan laparoscopic appendectomy, timing tbd. Last PO intake was 8am this morning  Continue IV abx and IVF  Analgesics and antiemetics as needed  Dr. Haley to see and provide additional recommendations as needed. If surgery today, plan for discharge home following procedure if recovering well.    Jorge Hendricks PA-C  2/20/2025  1:20 PM                           [1]   Allergies  Allergen Reactions    Nyquil Severe Cold-Flu [Phenyleph-Doxylamine-Dm-Apap] RASH

## 2025-02-20 NOTE — ED INITIAL ASSESSMENT (HPI)
Patient arrives to the ER complaining of lower abdominal pain that started Sunday, last night the pain became worse. No vomiting/diarrhea. +constipation.

## 2025-02-20 NOTE — ED QUICK NOTES
Orders for admission, patient is aware of plan and ready to go upstairs. Any questions, please call ED RN ingrid at extension [phone].     Patient Covid vaccination status: Partially vaccinated     COVID Test Ordered in ED: None    COVID Suspicion at Admission: N/A    Running Infusions:      Mental Status/LOC at time of transport: a/ox4    Other pertinent information:   Ambulatory.  NPO since 8am      CIWA score: N/A   NIH score:  N/A

## 2025-02-20 NOTE — ANESTHESIA PROCEDURE NOTES
Airway  Date/Time: 2/20/2025 2:14 PM  Urgency: Elective      General Information and Staff    Patient location during procedure: OR  Anesthesiologist: Roberto Morrissey MD  Resident/CRNA: Deirdre Saba CRNA  Performed: CRNA   Performed by: Deirdre Saba CRNA  Authorized by: Roberto Morrissey MD      Indications and Patient Condition  Indications for airway management: anesthesia  Sedation level: deep  Preoxygenated: yes  Patient position: sniffing  Mask difficulty assessment: 0 - not attempted    Final Airway Details  Final airway type: endotracheal airway      Successful airway: ETT  Cuffed: yes   Successful intubation technique: Video laryngoscopy  Facilitating devices/methods: rapid sequence intubation  Endotracheal tube insertion site: oral  Blade: GlideScope  Blade size: #4  ETT size (mm): 8.0    Cormack-Lehane Classification: grade I - full view of glottis  Placement verified by: capnometry   Measured from: teeth  ETT to teeth (cm): 22  Number of attempts at approach: 1    Additional Comments  Easy, atraumatic glidescope RSI intubation. Dentition, lips and mucosa unchanged.

## 2025-02-20 NOTE — ED PROVIDER NOTES
Patient Seen in: [facility] Emergency Department      History     Chief Complaint   Patient presents with    Abdomen/Flank Pain     Stated Complaint: abdominal pain    Subjective:   HPI      39-year-old male who has been having abdominal pain recently now more so in the upper midline and lower midline abdominal region with some associated constipation.  Its become much more severe recently.  No fever.  No nausea.  He does report some urinary frequency.  Not on metformin any longer but still continues to take his blood pressure medications.  No prior upper or lower endoscopy.  No bleeding.    Objective:     Past Medical History:    Asthma (HCC)              History reviewed. No pertinent surgical history.             Social History     Socioeconomic History    Marital status:    Tobacco Use    Smoking status: Never    Smokeless tobacco: Never   Vaping Use    Vaping status: Never Used   Substance and Sexual Activity    Alcohol use: Not Currently    Drug use: No   Other Topics Concern    Caffeine Concern Yes     Comment: on occasion    Exercise No   Social History Narrative    The patient does not use an assistive device..      The patient does live in a home with stairs.                  Physical Exam     ED Triage Vitals   BP 02/20/25 0936 149/85   Pulse 02/20/25 0936 94   Resp 02/20/25 0936 18   Temp 02/20/25 0936 98.8 °F (37.1 °C)   Temp src --    SpO2 02/20/25 0936 98 %   O2 Device 02/20/25 1215 None (Room air)       Current Vitals:   Vital Signs  BP: 150/77  Pulse: 64  Resp: 18  Temp: 98.8 °F (37.1 °C)  MAP (mmHg): 95    Oxygen Therapy  SpO2: 98 %  O2 Device: None (Room air)        Physical Exam  Constitutional: Oriented to person, place, and time.  Appears well-developed. No distress.  Morbidly obese  Head: Normocephalic and atraumatic.   Eyes: Conjunctivae are normal. Pupils are equal, round, and reactive to light.   Cardiovascular: Normal rate, regular rhythm and intact distal pulses.     Pulmonary/Chest: Effort normal. No respiratory distress.   Abdominal: Soft.  Somewhat diffuse mild to moderate intensity nonfocal tenderness.  No guarding.  No obvious mass organomegaly but exam limited secondary to body habitus  Musculoskeletal: Normal range of motion. No edema or tenderness.   Neurological: Alert and oriented to person, place, and time.   Skin: Skin is warm and dry.   Nursing note and vitals reviewed.    Differential diagnosis includes constipation, IBS, ileus and bowel obstruction much less likely, dyspepsia, UTI, uncontrolled diabetes and hyperglycemia and dehydration renal insufficiency and electrolyte abnormality.      ED Course     Labs Reviewed   COMP METABOLIC PANEL (14) - Abnormal; Notable for the following components:       Result Value    Glucose 161 (*)     ALT 9 (*)     Albumin 4.9 (*)     All other components within normal limits   URINALYSIS WITH CULTURE REFLEX - Abnormal; Notable for the following components:    Glucose Urine >1000 (*)     All other components within normal limits   LIPASE - Normal   CBC WITH DIFFERENTIAL WITH PLATELET   RAINBOW DRAW LAVENDER   RAINBOW DRAW LIGHT GREEN   RAINBOW DRAW BLUE            CT ABDOMEN+PELVIS(CONTRAST ONLY)(CPT=74177)    Result Date: 2/20/2025  PROCEDURE: CT ABDOMEN + PELVIS (CONTRAST ONLY) (CPT=74177)  COMPARISON: [facility], CT ABDOMEN + PELVIS (CONTRAST ONLY) (CPT=74177), 10/12/2016, 1:38 PM.  INDICATIONS: Abdominal pain  TECHNIQUE: Multidetector CT images of the abdomen and pelvis were obtained with non-ionic intravenous contrast material. Automated exposure control for dose reduction was used. Adjustment of the mA and/or kV was done based on the patient's size. Iterative reconstruction technique for dose reduction was employed.  FINDINGS: LUNG BASES: The heart is normal in size. There is dependent subsegmental atelectasis bilaterally. LIVER: Enlarged, measuring 23 cm in craniocaudal axis, and diffusely hypoattenuating  with areas of relative hyperdensity adjacent to the gallbladder fossa, consistent with hepatic steatosis and areas of fatty sparing. BILIARY: The gallbladder is present. PANCREAS: No lesion, fluid collection, ductal dilatation, or atrophy.  SPLEEN: Mildly enlarged 13.2 cm maximal craniocaudal extent spleen. ADRENALS:   No defined mass or abnormal enlargement.  KIDNEYS:   Symmetric enhancement is seen without evidence of hydronephrosis or underlying solid masses. GI/MESENTERY:  There is no evidence of bowel obstruction.  Mild gastric distention noted.  Please note that some segments of the colon are incompletely distended and suboptimally evaluated, mild colonic fecal burden.  Dilated and fluid-filled 1.6 cm diameter appendix in the right lower quadrant with mucosal hyperemia and mild periappendiceal inflammation; suspected debris or non radiopaque appendicolith at the appendiceal base URINARY BLADDER: Incompletely distended and suboptimally evaluated. PELVIC NODES: No lymphadenopathy.   PELVIC ORGANS: No visible mass. Pelvic organs appropriate for patient age.  VASCULATURE:   No aneurysm is detected. RETROPERITONEUM: No mass or lymphadenopathy is apparent.  BONES:   Mild lumbar spine degeneration. ABDOMINAL WALL: Tiny fat containing umbilical hernia. OTHER: No free air or fluid is seen in the abdomen or pelvis.          CONCLUSION:  1. Findings are concerning for acute appendicitis.  Although there is a small focus of gas in the proximal appendix, the appendix is dilated (1.6 cm diameter) and fluid-filled with mucosal hyperemia as well as mild periappendiceal inflammation.  No free intraperitoneal air or well-defined/drainable intra-abdominal collection.  Findings were discussed with Dr. Lama at the time of dictation. 2. Circumferential urinary bladder wall thickening, which may relate to incomplete distention or cystitis.  If there are referable symptoms, urinalysis correlation is requested. 3. Fatty liver with  mild-to-moderate hepatomegaly.  There is also borderline splenomegaly. 4. Lesser incidental findings as above.   elm-remote  Dictated by (CST): Lauri Miranda MD on 2/20/2025 at 11:58 AM     Finalized by (CST): Lauri Miranda MD on 2/20/2025 at 12:03 PM                [facility]          Admission disposition: 2/20/2025 12:16 PM           Medical Decision Making  Patient stable.  Talk to general surgery and seen by the PA here.  Will be kept NPO.  Hospitalist saw the patient.  IV Rocephin and Flagyl.  Patient is stable.    Problems Addressed:  Acute appendicitis with localized peritonitis, without perforation, abscess, or gangrene: acute illness or injury with systemic symptoms that poses a threat to life or bodily functions    Amount and/or Complexity of Data Reviewed  Labs: ordered. Decision-making details documented in ED Course.  Radiology: ordered and independent interpretation performed. Decision-making details documented in ED Course.     Details: By my review of the CT abdomen/pelvis there is no obvious evidence of bowel obstruction, free intraperitoneal air or significant free fluid.    Risk  Decision regarding hospitalization.  Elective major surgery with identified risk factors.        Disposition and Plan     Clinical Impression:  1. Acute appendicitis with localized peritonitis, without perforation, abscess, or gangrene         Disposition:  Admit  2/20/2025 12:16 pm    Follow-up:  No follow-up provider specified.        Medications Prescribed:  Current Discharge Medication List              Supplementary Documentation:         Hospital Problems       Present on Admission  Date Reviewed: 3/21/2024            ICD-10-CM Noted POA    * (Principal) Acute appendicitis with localized peritonitis, without perforation, abscess, or gangrene K35.30 2/20/2025 Unknown

## 2025-02-20 NOTE — ANESTHESIA POSTPROCEDURE EVALUATION
Patient: Ghulam Juarez Jr.    Procedure Summary       Date: 02/20/25 Room / Location: [facility] MAIN OR  / [facility] MAIN OR    Anesthesia Start: 1405 Anesthesia Stop:     Procedure: LAPAROSCOPIC APPENDECTOMY (Abdomen) Diagnosis: (Acute appendicitis)    Surgeons: Eri Haley MD Anesthesiologist: Roberto Morrissey MD    Anesthesia Type: general ASA Status: 3 - Emergent            Anesthesia Type: general    Vitals Value Taken Time   /65 02/20/25 1502   Temp 98 °F (36.7 °C) 02/20/25 1502   Pulse 88 02/20/25 1502   Resp 10 02/20/25 1502   SpO2 100 % 02/20/25 1502   Vitals shown include unfiled device data.    [facility] AN Post Evaluation:   Patient Evaluated in PACU  Patient Participation: complete - patient participated  Level of Consciousness: awake and alert  Pain Score: 0  Pain Management: adequate  Airway Patency:patent  Dental exam unchanged from preop  Yes    Nausea/Vomiting: none  Cardiovascular Status: acceptable  Respiratory Status: acceptable and nasal cannula  Postoperative Hydration acceptable    Deirdre Saba CRNA  2/20/2025 3:03 PM

## 2025-02-20 NOTE — DISCHARGE INSTRUCTIONS
POST-OPERATIVE INSTRUCTIONS LAPAROSCOPIC/ROBOTIC SURGERY    Thank you very much for allowing me to participate in your care, it is truly a privilege. Below please see discharge orders that will help you during your recovery. Please do not hesitate to call the office at [phone] for any questions. After hours, the same number will allow you to reach the on-call surgeon.     Call the office [phone] to schedule your 2 week post-operative appointment with Dr. Haley or her Physician Assistant (PA).    Change bandages daily or more frequently if needed.  Keep incisions clean with soap/ water daily.   Cover incision(s) as needed.  If you have skin glue this will come off on its own    May place an ice pack over your incisions on and off (15min) at a time for the next 24-48 hours.    Resume regular diet as tolerated (recommend starting with liquids)    General guidelines for activity:      Avoid strenuous activity or lifting anything heavier than 15 pounds.    It is OK to be up and walking around. Going up and down stairs is also OK.    Do what is comfortable: stop and rest when you feel tired.    It is OK to shower after 24 hours    You will have pain medicine ordered. Take as directed/needed.    Some discomfort, mild bruising, and swelling are not unusual; please call my office if you have any severe pain, bleeding, or high fever (over 101°F)    During the robotic/laparoscopic procedure that you had, gas is pumped into the abdominal cavity.  You may feel abdominal, shoulder, or rib pain for a few days due to this.    Resume home medications (see medication reconciliation sheet)       Do NOT drive for one day and while taking your narcotic pain medicine.        Watch for signs of infection:    Excessive warmth or bright redness around your incisions    Leakage of bloody or cloudy fluid from you incisions    Fever over 100.5    If you experience constipation  Increase your water intake.  Increase your activity;  walking is best.  An over the counter stool softener or mild laxative may be necessary if you still have not had a bowel movement after several days.       Please call the office at [phone] for any questions and to make your post op appointment if needed. Thank you again for allowing me to participate in your care, and get well soon!      Eri Haley MD  [facility] - General Surgery   [address], [place]  p [phone]        INSTRUCCIONES POSTOPERATORIAS PARA CIRUGÍA LAPAROSCÓPICA/ROBOTIZADA  Muchas lizzie por permitirme participar en brady atención, es un verdadero privilegio. A continuación, encontrará las órdenes de sinan que le ayudarán samantha brady recuperación. No dude en llamar a la oficina al [phone] si tiene alguna pregunta. Después de horas, el mismo número le permitirá comunicarse con el cirujano de marco a.    o Llame a la oficina [phone] para programar brady josie de seguimiento postoperatorio con jose g Haley para dentro de 2 semanas, si aún no está programada.    o (Puede ser necesario que lo caterina antes de 2 semanas si tiene puntos y/o drenajes presentes)    o  Cambie los vendajes diariamente o con más frecuencia si es necesario.    o Mantenga las incisiones limpias con jabón y agua a diario.    o Cubra la(s) incisión(es) según sea necesario.    o Por favor, retire las tiras adhesivas (Steri-Strips) 5 días después de la cirugía. [place] incluye cualquier vendaje transparente y gasa colocada en el ombligo, si es aplicable.    o Si tiene pegamento para la piel, yanique se caerá solo.    o  Puede colocar debbie bolsa de hielo sobre alyce incisiones de manera intermitente (15 minutos) samantha las próximas 24-48 horas.    o  Reanude brady dieta habitual según lo tolere (se recomienda comenzar con líquidos).    o  Pautas generales para la actividad:    - Evite actividades extenuantes o levantar objetos que pesen más de 15 libras.     - Está jose angel levantarse y caminar. También  está jose angel subir y bajar escaleras.    - Jose Luis lo que le resulte cómodo: deténgase y descanse cuando se sienta cansado.    O Está jose angel ducharse después de 24 horas.    o Tendrá medicamentos para el dolor prescritos. Tómelo según las indicaciones/necesidades.    o Algunas molestias, moretones leves e hinchazón no son inusuales; por favor llame a mi oficina si tiene un dolor severo, sangrado o fiebre sinan (superior a 101°F).    o Samantha el procedimiento robótico/laparoscópico que tuvo, se introduce gas en la cavidad abdominal. Puede sentir dolor abdominal, en los hombros o en las costillas samantha unos días debido a esto.    o Reanude alyce medicamentos habituales (consulte la hoja de conciliación de medicamentos).    o NO conduzca samantha un día y mientras esté tomando brady medicamento narcótico para el dolor.    O Observe los signos de infección:   - Calor excesivo o enrojecimiento brillante alrededor de alyce incisiones.   - Filtración de líquido sanguinolento o turbio de alyce incisiones.   - Fiebre superior a 100.5.   - Si experimenta estreñimiento:    o Aumente brady ingesta de agua.    o Aumente brady actividad; caminar es lo mejor.    o Un ablandador de heces de venta fransisco o un laxante suave pueden ser necesarios si no ha tenido debbie evacuación intestinal después de varios días.    Por favor, llame a la oficina al [phone] si tiene alguna pregunta y para programar brady josie postoperatoria si es necesario. Ade nuevamente por permitirme participar en brady atención, ¡y que se recupere pronto!        Eri Haley MD  [facility] - [address]  p [phone]    [facility] [place] HOME CARE INSTRUCTIONS: POST-OP ANESTHESIA  The medication that you received for sedation or general anesthesia can last up to 24 hours. Your judgment and reflexes may be altered, even if you feel like your normal self.      We Recommend:   Do not drive any motor vehicle or bicycle   Avoid mowing  the lawn, playing sports, or working with power tools/applicances (power saws, electric knives or mixers)   That you have someone stay with you on your first night home   Do not drink alcohol or take sleeping pills or tranquilizers   Do not sign legal documents within 24 hours of your procedure   If you had a nerve block for your surgery, take extra care not to put any pressure on your arm or hand for 24 hours    It is normal:  For you to have a sore throat if you had a breathing tube during surgery (while you were asleep!). The sore throat should get better within 48 hours. You can gargle with warm salt water (1/2 tsp in 4 oz warm water) or use a throat lozenge for comfort  To feel muscle aches or soreness especially in the abdomen, chest or neck. The achy feeling should go away in the next 24 hours  To feel weak, sleepy or \"wiped out\". Your should start feeling better in the next 24 hours.   To experience mild discomforts such as sore lip or tongue, headache, cramps, gas pains or a bloated feeling in your abdomen.   To experience mild back pain or soreness for a day or two if you had spinal or epidural anesthesia.   If you had laparoscopic surgery, to feel shoulder pain or discomfort on the day of surgery.   For some patients to have nausea after surgery/anesthesia    If you feel nausea or experience vomiting:   Try to move around less.   Eat less than usual or drink only liquids until the next morning   Nausea should resolve in about 24 hours    If you have a problem when you are at home:    Call your surgeons office     Discharge Instructions: After Your Surgery  You’ve just had surgery. During surgery, you were given medicine called anesthesia to keep you relaxed and free of pain. After surgery, you may have some pain or nausea. This is common. Here are some tips for feeling better and getting well after surgery.   Going home  Your healthcare provider will show you how to take care of yourself when you go home.  They'll also answer your questions. Have an adult family member or friend drive you home. For the first 24 hours after your surgery:   Don't drive or use heavy equipment.  Don't make important decisions or sign legal papers.  Take medicines as directed.  Don't drink alcohol.  Have someone stay with you, if needed. They can watch for problems and help keep you safe.  Be sure to go to all follow-up visits with your healthcare provider. And rest after your surgery for as long as your provider tells you to.   Coping with pain  If you have pain after surgery, pain medicine will help you feel better. Take it as directed, before pain becomes severe. Also, ask your healthcare provider or pharmacist about other ways to control pain. This might be with heat, ice, or relaxation. And follow any other instructions your surgeon or nurse gives you.      Stay on schedule with your medicine.      Tips for taking pain medicine  To get the best relief possible, remember these points:   Pain medicines can upset your stomach. Taking them with a little food may help.  Most pain relievers taken by mouth need at least 20 to 30 minutes to start to work.  Don't wait till your pain becomes severe before you take your medicine. Try to time your medicine so that you can take it before starting an activity. This might be before you get dressed, go for a walk, or sit down for dinner.  Constipation is a common side effect of some pain medicines. Call your healthcare provider before taking any medicines such as laxatives or stool softeners to help ease constipation. Also ask if you should skip any foods. Drinking lots of fluids and eating foods such as fruits and vegetables that are high in fiber can also help. Remember, don't take laxatives unless your surgeon has prescribed them.  Drinking alcohol and taking pain medicine can cause dizziness and slow your breathing. It can even be deadly. Don't drink alcohol while taking pain medicine.  Pain  medicine can make you react more slowly to things. Don't drive or run machinery while taking pain medicine.  Your healthcare provider may tell you to take acetaminophen to help ease your pain. Ask them how much you're supposed to take each day. Acetaminophen or other pain relievers may interact with your prescription medicines or other over-the-counter (OTC) medicines. Some prescription medicines have acetaminophen and other ingredients in them. Using both prescription and OTC acetaminophen for pain can cause you to accidentally overdose. Read the labels on your OTC medicines with care. This will help you to clearly know the list of ingredients, how much to take, and any warnings. It may also help you not take too much acetaminophen. If you have questions or don't understand the information, ask your pharmacist or healthcare provider to explain it to you before you take the OTC medicine.   Managing nausea  Some people have an upset stomach (nausea) after surgery. This is often because of anesthesia, pain, or pain medicine, less movement of food in the stomach, or the stress of surgery. These tips will help you handle nausea and eat healthy foods as you get better. If you were on a special food plan before surgery, ask your healthcare provider if you should follow it while you get better. Check with your provider on how your eating should progress. It may depend on the surgery you had. These general tips may help:   Don't push yourself to eat. Your body will tell you when to eat and how much.  Start off with clear liquids and soup. They're easier to digest.  Next try semi-solid foods as you feel ready. These include mashed potatoes, applesauce, and gelatin.  Slowly move to solid foods. Don’t eat fatty, rich, or spicy foods at first.  Don't force yourself to have 3 large meals a day. Instead eat smaller amounts more often.  Take pain medicines with a small amount of solid food, such as crackers or toast. This helps  prevent nausea.  When to call your healthcare provider  Call your healthcare provider right away if you have any of these:   You still have too much pain, or the pain gets worse, after taking the medicine. The medicine may not be strong enough. Or there may be a complication from the surgery.  You feel too sleepy, dizzy, or groggy. The medicine may be too strong.  Side effects such as nausea or vomiting. Your healthcare provider may advise taking other medicines to .  Skin changes such as rash, itching, or hives. This may mean you have an allergic reaction. Your provider may advise taking other medicines.  The incision looks different (for instance, part of it opens up).  Bleeding or fluid leaking from the incision site, and weren't told to expect that.  Fever of 100.4°F (38°C) or higher, or as directed by your provider.  Call 911  Call 911 right away if you have:   Trouble breathing  Facial swelling     If you have obstructive sleep apnea   You were given anesthesia medicine during surgery to keep you comfortable and free of pain. After surgery, you may have more apnea spells because of this medicine and other medicines you were given. The spells may last longer than normal.    At home:  Keep using the continuous positive airway pressure (CPAP) device when you sleep. Unless your healthcare provider tells you not to, use it when you sleep, day or night. CPAP is a common device used to treat obstructive sleep apnea.  Talk with your provider before taking any pain medicine, muscle relaxants, or sedatives. Your provider will tell you about the possible dangers of taking these medicines.  Contact your provider if your sleeping changes a lot even when taking medicines as directed.  [employer] last reviewed this educational content on 10/1/2021  © 3134-7630 [employer]. All rights reserved. This information is not intended as a substitute for professional medical care. Always follow your healthcare  professional's instructions.

## 2025-02-20 NOTE — OPERATIVE REPORT
OPERATIVE NOTE    PATIENT NAME: Ghulam Juarez Jr.    :  1985    MRN:  [id number]  ATTENDING PHYSICIAN:  Eri Haley MD    PROCEDURE DATE:  2025       PREOPERATIVE DIAGNOSIS:   Acute appendicitis   Morbid obesity (BMI 47.25)    POSTOPERATIVE DIAGNOSIS: Acute suppurative appendicitis     SURGEON: Eri Haley MD    ASSISTANT: Jorge Hendricks PA-C     OPERATION: 1) Laparoscopic appendectomy    ANESTHESIA: General    ESTIMATED BLOOD LOSS:  5 ml    COMPLICATIONS: none     SPECIMENS: Appendix    INDICATIONS: The patient is a 39 year old year old male with history of above preop diagnosis.  I explained the risk, benefits, expected outcome, and alternatives to the procedure. Patient understands the risks include but not inclusive to bleeding, infection, anesthesia complication, blood vessel/nerve damage, chronic pain, reoperation, and failure of the procedure to obtain its intended goals.  Patient understands and is in agreement and would like to proceed.     DESCRIPTION OF PROCEDURE:    Patient was taken to the operating room and underwnet general endotracheal anesthesia. Patient was placed in the supine position with the left arm tucked. Bony prominences were protected on the operating table. The abdomen was prepped and draped in the usual standard sterile fashion.  An appropriate timeout protocol was completed verifying patient and correct procedures to be completed.  All parties agreed.      A small skin incision was made at Munoz's point in the left upper quadrant. Using a Veress needle, access was gained to the intraabdominal cavity.  Pneumoperitoneum was established with CO2 gas to pressure of 15 mm Hg.  5mm optiview trocar was placed in the umbilical position. A 12 mm trocar was placed in the left lower quadrant and another 5 mm trocar in the suprapubic position under direct visualization. The patient was placed in Trendelenburg position with a tilt towards the left.  The small bowel and sigmoid colon were  swept to the patient's left exposing the cecum. The cecum was identified and an inflamed appendix was also identified.  The terminal ileum was identified and protected. The appendix was grasped at mid body and retracted away from the cecum with a laparoscopic grasper.  The mesoappendix was divided using Ligasure energy device. The appendix was then divided using endoscopic CANELO linear cutting stapler purple tissue load.  No bleeding was observed from the staple line.  Hemostasis was achieved.  The appendix was then removed from the abdomen using a laparoscopic specimen bag. The 12 mm port fascia was closed with 0-vicryl under direct visualization using a laparoscopic suture passer. The trocar sites were infiltrated with 0.25% marcaine in the TAP plane and the skin. The abdomen was then desulflated and cannulas removed under direct visualization.  The wounds were irrigated and the skin incisions closed with interrupted 4-0 Moncryl subcuticular sutures.  Dermabond was applied.  All instrument, sponge and needle counts were correct.  I was present and scrubbed for the entire operation. The patient tolerated the procedure well and was sent to PACU in stable condition.      Eri Haley MD  [facility] - General Surgery  [address] [place] [id number]  p [phone]

## 2025-02-20 NOTE — H&P
[facility]    PATIENT'S NAME: ELENI DO JR.   ATTENDING PHYSICIAN: Ephraim Lama MD   PATIENT ACCOUNT#:   [id number]    LOCATION:  [place] [address]  MEDICAL RECORD #:   [id number]       YOB: 1985  ADMISSION DATE:       02/20/2025    HISTORY AND PHYSICAL EXAMINATION    #####EDITING MAY BE REQUIRED#####    CHIEF COMPLAINT:  Acute appendicitis.    HISTORY OF PRESENT ILLNESS:  Patient is a 39-year-old  male who came into the emergency department for evaluation of generalized abdominal pain since last night.  CBC and chemistry were unremarkable.  Glucose 161.  CT scan of the abdomen and pelvis showed acute appendicitis, small focus of gas in the proximal appendix.  Appendix is dilated and fluid filled with mucosal hyperemia as well as mild periappendiceal inflammatory changes, no free intraperitoneal air to suggest perforation.  Patient was started on IV antibiotics, and he will be admitted to the hospital for further management.      PAST MEDICAL HISTORY:  Morbid obesity, obstructive sleep apnea, essential hypertension, diabetes mellitus type 2, and asthma.    PAST SURGICAL HISTORY:  He had hand surgeries bilaterally to separate digits as a child.    MEDICATIONS:  Please see medication reconciliation list.     ALLERGIES:  _______    FAMILY HISTORY:  Positive for hypertension.    SOCIAL HISTORY:  No tobacco.  Occasional alcohol.  No drug use.  Independent in his basic activities of daily living.     REVIEW OF SYSTEMS:  Generalized abdominal pain since last night associated with malaise and fatigue, nausea.  No vomiting.      PHYSICAL EXAMINATION:    GENERAL:  Alert and oriented to time, place and person.  Moderate distress.   VITAL SIGNS:  Temperature 98.8, pulse 64, respiratory rate 18, blood pressure 150/77, pulse ox 98% on room air.  HEENT:  Atraumatic.  Oropharynx clear.  Dry mucous membranes.  Ears and nose normal.  Eyes:  Anicteric sclerae.   NECK:  Supple.  No  lymphadenopathy.  Trachea midline.  Full range of motion.   LUNGS:  Clear to auscultation bilaterally.  Normal respiratory effort.    HEART:  Regular rate and rhythm.  S1 and S2 auscultated.  No murmur.    ABDOMEN:  Soft, nondistended.  Obese.  Positive bowel sounds.  Mild discomfort to right lower quadrant area palpation.  EXTREMITIES:  No peripheral edema, clubbing or cyanosis.   NEUROLOGIC:  Motor and sensory intact.    ASSESSMENT:    1.   Acute appendicitis.    2.   Morbid obesity.  3.   Diabetes mellitus type 2.  4.   Essential hypertension.     PLAN:  Patient will be admitted to general medical floor.  Pain control.  IV antibiotics.  N.p.o.  IV fluids.  General surgery consult to evaluate patient for laparoscopic appendectomy.  Further recommendations to follow.     Dictated By Corry Duenas MD  d: 02/20/2025 12:38:15  t: 02/20/2025 13:23:45  Job [id number]  FB/

## 2025-02-21 NOTE — PROGRESS NOTES
Patient alert and orient. IV removed. Tolerated PO. AVS review, all questions answered. Patient sent home with discharge paperwork and belongings. Pt refuse to be wheeled out in wheel chair. Writer of note walked out with pt, and pt's wife with wheel chair. Pt denies any dizziness or weakness. Pt left hospital through ED entrance.

## 2025-02-24 ENCOUNTER — TELEPHONE (OUTPATIENT)
Dept: SURGERY | Facility: CLINIC | Age: 40
End: 2025-02-24

## 2025-02-24 RX ORDER — METHOCARBAMOL 500 MG/1
750 TABLET, FILM COATED ORAL 3 TIMES DAILY PRN
Qty: 10 TABLET | Refills: 0 | Status: SHIPPED | OUTPATIENT
Start: 2025-02-24

## 2025-02-24 NOTE — TELEPHONE ENCOUNTER
Per patient continues to have post op pain, asking for refill on oxycodone. Please advise thank you.

## 2025-02-25 ENCOUNTER — TELEPHONE (OUTPATIENT)
Dept: SURGERY | Facility: CLINIC | Age: 40
End: 2025-02-25

## 2025-02-25 DIAGNOSIS — K35.80 ACUTE APPENDICITIS, UNSPECIFIED ACUTE APPENDICITIS TYPE: Primary | ICD-10-CM

## 2025-02-25 RX ORDER — OXYCODONE HYDROCHLORIDE 5 MG/1
5 CAPSULE ORAL EVERY 4 HOURS PRN
Qty: 5 CAPSULE | Refills: 0 | Status: SHIPPED | OUTPATIENT
Start: 2025-02-25 | End: 2025-02-25 | Stop reason: ALTCHOICE

## 2025-02-25 RX ORDER — OXYCODONE HYDROCHLORIDE 5 MG/1
5 TABLET ORAL EVERY 4 HOURS PRN
Qty: 5 TABLET | Refills: 0 | Status: SHIPPED | OUTPATIENT
Start: 2025-02-25

## 2025-02-25 NOTE — TELEPHONE ENCOUNTER
Patient states the robaxin did not help pain and he is unable to sleep at night due to pain. Patient asking for refill of oxycodone for only a couple more days, please advise.       Current Outpatient Medications:     oxyCODONE 5 MG Oral Tab, Take 1 tablet (5 mg total) by mouth every 4 (four) hours as needed for Pain., Disp: 5 tablet, Rfl: 0

## 2025-03-03 ENCOUNTER — TELEPHONE (OUTPATIENT)
Dept: SURGERY | Facility: CLINIC | Age: 40
End: 2025-03-03

## 2025-03-07 ENCOUNTER — NURSE ONLY (OUTPATIENT)
Dept: SURGERY | Facility: CLINIC | Age: 40
End: 2025-03-07
Payer: MEDICAID

## 2025-03-07 VITALS
HEART RATE: 72 BPM | WEIGHT: 315 LBS | SYSTOLIC BLOOD PRESSURE: 134 MMHG | BODY MASS INDEX: 47 KG/M2 | DIASTOLIC BLOOD PRESSURE: 72 MMHG

## 2025-03-07 DIAGNOSIS — M79.18 ABDOMINAL MUSCLE PAIN: ICD-10-CM

## 2025-03-07 DIAGNOSIS — Z48.89 ENCOUNTER FOR POSTOPERATIVE CARE: Primary | ICD-10-CM

## 2025-03-07 PROBLEM — K35.30 ACUTE APPENDICITIS WITH LOCALIZED PERITONITIS, WITHOUT PERFORATION, ABSCESS, OR GANGRENE: Status: RESOLVED | Noted: 2025-02-20 | Resolved: 2025-03-07

## 2025-03-07 PROCEDURE — 99024 POSTOP FOLLOW-UP VISIT: CPT

## 2025-03-07 RX ORDER — METHOCARBAMOL 500 MG/1
500 TABLET, FILM COATED ORAL 4 TIMES DAILY
Qty: 40 TABLET | Refills: 0 | Status: SHIPPED | OUTPATIENT
Start: 2025-03-07 | End: 2025-03-17

## 2025-03-07 NOTE — PROGRESS NOTES
Follow Up Visit Note       Active Problems      1. Encounter for postoperative care    2. Abdominal muscle pain          Chief Complaint   Chief Complaint   Patient presents with    Post-Op     S/P lap appi 2/20.  Patient states he has a little pain, some constipation, appetite is good, denies fevers.         History of Present Illness  Ghulam is a pleasant 39 year old year old patient presenting for post op appointment. he generally feels well, he endorses abdominal discomfort with bending and prolonged sitting. It has not changed much since surgery. He is concerned he will pop a suture if he bends to far. It becomes painful at times when he twists or lies down. He has been working light duty at work. he is tolerating a general diet without diarrhea or constipation. he denies fever, chills, SOB, chest pain, leg swelling or calf tenderness.       Allergies  Ghulam is allergic to nyquil severe cold-flu [phenyleph-doxylamine-dm-apap].    Past Medical / Surgical / Social / Family History    The past medical and past surgical history have been reviewed by me today.    Past Medical History:    Asthma (HCC)     Past Surgical History:   Procedure Laterality Date    Laparoscopic appendectomy  02/20/2025       The family history and social history have been reviewed by me today.    History reviewed. No pertinent family history.  Social History     Socioeconomic History    Marital status:    Tobacco Use    Smoking status: Never     Passive exposure: Never    Smokeless tobacco: Never   Vaping Use    Vaping status: Never Used   Substance and Sexual Activity    Alcohol use: Not Currently    Drug use: No   Other Topics Concern    Caffeine Concern Yes     Comment: on occasion    Exercise No        Current Outpatient Medications:     methocarbamol 500 MG Oral Tab, Take 1 tablet (500 mg total) by mouth 4 (four) times daily for 10 days., Disp: 40 tablet, Rfl: 0    methocarbamol 500 MG Oral Tab, Take 1.5 tablets (750 mg total)  by mouth 3 (three) times daily as needed., Disp: 10 tablet, Rfl: 0    NON FORMULARY, Take 1-3 tablets by mouth 3 (three) times daily before meals. Natural OTC medication from [place] - takes instead of Metformin, Disp: , Rfl:     acetaminophen 500 MG Oral Tab, Take 2 tablets (1,000 mg total) by mouth every 4 (four) hours as needed for Pain., Disp: 40 tablet, Rfl: 0    Ibuprofen (ADVIL) 200 MG Oral Cap, Take 2 capsules (400 mg total) by mouth every 6 (six) hours as needed., Disp: 40 capsule, Rfl: 0    Glucose Blood (ONETOUCH ULTRA TEST) In Vitro Strip, CHECK BLOOD SUGAR ONCE DAILY Type 2 diabetes mellitus with hyperglycemia, without long-term current use of insulin ([facility]) E11.9, Disp: 100 strip, Rfl: 2    Valsartan-hydroCHLOROthiazide 320-25 MG Oral Tab, Take 1 tablet by mouth daily., Disp: 90 tablet, Rfl: 3    amLODIPine 5 MG Oral Tab, Take 1 tablet (5 mg total) by mouth daily., Disp: 90 tablet, Rfl: 3    Lancets 33G Does not apply Misc, 1 each daily. Check blood sugar daily. May substitute, Disp: 100 each, Rfl: 2     Review of Systems  The Review of Systems has been reviewed by me during today.  Review of Systems   Constitutional:  Negative for chills, fatigue and fever.   Respiratory:  Negative for shortness of breath.    Cardiovascular:  Negative for chest pain and leg swelling.   Gastrointestinal:  Positive for abdominal pain. Negative for constipation, diarrhea, nausea and vomiting.        Physical Findings   /72 (BP Location: Right arm, Patient Position: Sitting, Cuff Size: large)   Pulse 72   Wt (!) 368 lb (166.9 kg)   BMI 47.25 kg/m²   Physical Exam  Constitutional:       General: He is not in acute distress.     Appearance: He is obese. He is not ill-appearing.   HENT:      Head: Normocephalic and atraumatic.   Eyes:      Extraocular Movements: Extraocular movements intact.      Conjunctiva/sclera: Conjunctivae normal.      Pupils: Pupils are equal, round, and reactive to light.   Abdominal:       General: There is no distension.      Palpations: Abdomen is soft.      Tenderness: There is abdominal tenderness.   Skin:     General: Skin is warm.      Comments: incision C/D/I.   Neurological:      Mental Status: He is alert.          Assessment   1. Encounter for postoperative care    2. Abdominal muscle pain      Pathology reviewed with the patient    Plan   Continue avoiding heavy lifting for another 2 weeks, regular duty 3/24/2025  Robaxin for muscle pain  Abdominal binder as needed for activity, avoid daily use.  Continue good hygiene of incision site  OK to swim or take a bath  Follow up as needed       No orders of the defined types were placed in this encounter.      Imaging & Referrals   None    Follow Up  No follow-ups on file.    TIFFANIE Torres

## 2025-03-17 ENCOUNTER — OFFICE VISIT (OUTPATIENT)
Dept: FAMILY MEDICINE CLINIC | Facility: CLINIC | Age: 40
End: 2025-03-17
Payer: MEDICAID

## 2025-03-17 VITALS
WEIGHT: 315 LBS | DIASTOLIC BLOOD PRESSURE: 77 MMHG | SYSTOLIC BLOOD PRESSURE: 135 MMHG | BODY MASS INDEX: 40.43 KG/M2 | HEIGHT: 74 IN | OXYGEN SATURATION: 98 % | HEART RATE: 71 BPM | TEMPERATURE: 97 F | RESPIRATION RATE: 18 BRPM

## 2025-03-17 DIAGNOSIS — E11.65 TYPE 2 DIABETES MELLITUS WITH HYPERGLYCEMIA, WITHOUT LONG-TERM CURRENT USE OF INSULIN (HCC): Primary | ICD-10-CM

## 2025-03-17 DIAGNOSIS — J30.2 SEASONAL ALLERGIC RHINITIS: ICD-10-CM

## 2025-03-17 DIAGNOSIS — M54.50 DORSALGIA OF LUMBAR REGION: ICD-10-CM

## 2025-03-17 DIAGNOSIS — I10 ESSENTIAL HYPERTENSION: ICD-10-CM

## 2025-03-17 LAB — HEMOGLOBIN A1C: 6.5 % (ref 4.3–5.6)

## 2025-03-17 PROCEDURE — 83036 HEMOGLOBIN GLYCOSYLATED A1C: CPT

## 2025-03-17 PROCEDURE — 99213 OFFICE O/P EST LOW 20 MIN: CPT

## 2025-03-17 RX ORDER — MELOXICAM 7.5 MG/1
7.5 TABLET ORAL DAILY PRN
Qty: 30 TABLET | Refills: 0 | Status: SHIPPED | OUTPATIENT
Start: 2025-03-17

## 2025-03-17 RX ORDER — VALSARTAN AND HYDROCHLOROTHIAZIDE 320; 25 MG/1; MG/1
1 TABLET, FILM COATED ORAL DAILY
Qty: 90 TABLET | Refills: 3 | Status: SHIPPED | OUTPATIENT
Start: 2025-03-17

## 2025-03-17 RX ORDER — AMLODIPINE BESYLATE 5 MG/1
5 TABLET ORAL DAILY
Qty: 90 TABLET | Refills: 3 | Status: SHIPPED | OUTPATIENT
Start: 2025-03-17

## 2025-03-17 NOTE — PROGRESS NOTES
HPI:    Patient ID: Ghulam Juarez Jr. is a 39 year old male.    HPI  Chief Complaint   Patient presents with    Follow - Up     diabetes     Patient here in office for follow-up regarding diabetes.  Last hemoglobin A1c checked on 3/23/2024 and was 5.9.  Was previously taking metformin 500 mg twice daily but stopped since sugar levels were managed with diet.  Hemoglobin A1c in office today 6.5.  Recently had appendectomy.  Also states he has been eating more carbohydrates/processed sugars around the holiday.  Checks fasting blood glucose at home randomly and readings have been ranging between 119-140 MGs/DL.  Continues to use moringa leaf (herbal supplement) daily.     Blood pressure also elevated at arrival, B/P was 152/81.  Rechecked and B/P was 135/77.  States home blood pressure readings ranging between 128-130/70-80.  Denies headache, lightheadedness, dizziness, or vision changes.  Taking valsartan-hydrochlorothiazide 320-25 daily and amlodipine 5 mg daily.    Reports history of chronic lumbar back pain.  Aggravated with heavy lifting.  Requesting medication to take as needed when pain occurs.    Review of Systems   Constitutional: Negative.    Respiratory: Negative.     Cardiovascular: Negative.    Endocrine: Negative.    Musculoskeletal:  Positive for back pain.   Skin: Negative.    Neurological: Negative.    Psychiatric/Behavioral: Negative.         /77   Pulse 71   Temp 97.1 °F (36.2 °C) (Temporal)   Resp 18   Ht 6' 2\" (1.88 m)   Wt (!) 373 lb (169.2 kg)   SpO2 98%   BMI 47.89 kg/m²     Past Medical History:    Acute appendicitis with localized peritonitis, without perforation, abscess, or gangrene    Asthma (HCC)     Past Surgical History:   Procedure Laterality Date    Laparoscopic appendectomy  02/20/2025     Social History     Socioeconomic History    Marital status:      Spouse name: Not on file    Number of children: Not on file    Years of education: Not on file    Highest  education level: Not on file   Occupational History    Not on file   Tobacco Use    Smoking status: Never     Passive exposure: Never    Smokeless tobacco: Never   Vaping Use    Vaping status: Never Used   Substance and Sexual Activity    Alcohol use: Not Currently    Drug use: No    Sexual activity: Not on file   Other Topics Concern    Caffeine Concern Yes     Comment: on occasion    Exercise No    Seat Belt Not Asked    Special Diet Not Asked    Stress Concern Not Asked    Weight Concern Not Asked     Service Not Asked    Blood Transfusions Not Asked    Occupational Exposure Not Asked    Hobby Hazards Not Asked    Sleep Concern Not Asked    Back Care Not Asked    Bike Helmet Not Asked    Self-Exams Not Asked   Social History Narrative    The patient does not use an assistive device..      The patient does live in a home with stairs.     Social Drivers of Health     Food Insecurity: Not on file   Transportation Needs: Not on file   Stress: Not on file   Housing Stability: Not on file     History reviewed. No pertinent family history.    Immunization History   Administered Date(s) Administered    Covid-19 Vaccine PENRITH (J&J) 0.5ml 04/04/2021    FLU VAC QIV SPLIT 3 YRS AND OLDER (87044) 11/05/2016       Health Maintenance   Topic Date Due    Diabetes Care Dilated Eye Exam  Never done    Pneumococcal Vaccine: Birth to 50yrs (1 of 2 - PCV) Never done    DTaP,Tdap,and Td Vaccines (1 - Tdap) Never done    Annual Physical  05/27/2024    COVID-19 Vaccine (2 - 2024-25 season) 09/01/2024    Annual Depression Screening  01/01/2025    Diabetes Care: Foot Exam (Annual)  Never done    Diabetes Care: Microalb/Creat Ratio (Annual)  01/01/2025    Influenza Vaccine (1) 10/01/2025 (Originally 10/1/2024)    Diabetes Care A1C  09/17/2025    Diabetes Care: GFR  02/20/2026    Meningococcal B Vaccine  Aged Out          Current Outpatient Medications   Medication Sig Dispense Refill    ALBUTEROL 108 (90 Base) MCG/ACT Inhalation  Aero Soln Inhale 1-2 puffs into the lungs every 6 (six) hours as needed for Wheezing. 18 g 0    FLUTICASONE PROPIONATE 50 MCG/ACT Nasal Suspension SPRAY 1 SPRAY BY NASAL ROUTE DAILY. 16 g 0    Meloxicam 7.5 MG Oral Tab Take 1 tablet (7.5 mg total) by mouth daily as needed for Pain. 30 tablet 0    Valsartan-hydroCHLOROthiazide 320-25 MG Oral Tab Take 1 tablet by mouth daily. 90 tablet 3    amLODIPine 5 MG Oral Tab Take 1 tablet (5 mg total) by mouth daily. 90 tablet 3    NON FORMULARY Take 1-3 tablets by mouth 3 (three) times daily before meals. Natural OTC medication from [place] - takes instead of Metformin      Glucose Blood (ONETOUCH ULTRA TEST) In Vitro Strip CHECK BLOOD SUGAR ONCE DAILY Type 2 diabetes mellitus with hyperglycemia, without long-term current use of insulin ([facility]) E11.9 100 strip 2    Lancets 33G Does not apply Misc 1 each daily. Check blood sugar daily. May substitute 100 each 2     Allergies:Allergies[1]   PHYSICAL EXAM:     Chief Complaint   Patient presents with    Follow - Up     diabetes      Physical Exam  Constitutional:       General: He is not in acute distress.     Appearance: Normal appearance. He is not ill-appearing.   Cardiovascular:      Rate and Rhythm: Normal rate.      Heart sounds: Normal heart sounds. No murmur heard.     No friction rub. No gallop.   Pulmonary:      Effort: Pulmonary effort is normal. No respiratory distress.      Breath sounds: Normal breath sounds. No stridor. No wheezing, rhonchi or rales.   Chest:      Chest wall: No tenderness.   Musculoskeletal:         General: No tenderness.   Skin:     General: Skin is warm.      Findings: No rash.   Neurological:      General: No focal deficit present.      Mental Status: He is alert and oriented to person, place, and time.   Psychiatric:         Mood and Affect: Mood normal.         Behavior: Behavior normal.         Thought Content: Thought content normal.         Judgment: Judgment normal.                 ASSESSMENT/PLAN:     Return yearly for physicals  Follow up with dentist every 6 months  Follow up with eye doctor yearly  Recommend aerobic exercise for at least 30mins 5 days a week  Yearly flu shot  Tetanus booster every 10 years (Tdap/ Td)  Labs ordered/ or reviewed if done prior to appointment     Encounter Diagnoses   Name Primary?    Type 2 diabetes mellitus with hyperglycemia, without long-term current use of insulin (HCC) Yes    Essential hypertension     Dorsalgia of lumbar region        1. Type 2 diabetes mellitus with hyperglycemia, without long-term current use of insulin (HCC)  - Hemoglobin A1C in office was 6.5  - Patient declines restarting Metformin. Would like to manage diabetes by diet  - Handout given on low glycemic diet and diabetes plate   - Recheck hemoglobin A1C in 3 months, order placed   - POC Glycohemoglobin [02615]  - Comp Metabolic Panel (14); Future  - Lipid Panel; Future  - Microalb/Creat Ratio, Random Urine; Future  - CBC W Differential W Platelet [E]; Future  - Hemoglobin A1C [E]; Future  - Hemoglobin A1C [E]; Future    2. Essential hypertension  - Continue present management   - Comp Metabolic Panel (14); Future  - Lipid Panel; Future    3. Dorsalgia of lumbar region  - Meloxicam 7.5 MG Oral Tab,  Take 1 tablet (7.5 mg total) by mouth daily as needed for Pain.       Orders Placed This Encounter   Procedures    POC Glycohemoglobin [05354]    Comp Metabolic Panel (14)    Lipid Panel    Microalb/Creat Ratio, Random Urine    CBC W Differential W Platelet [E]    Hemoglobin A1C [E]    Hemoglobin A1C [E]       The above note was creating using Dragon speech recognition technology. Please excuse any typos    Meds This Visit:  Requested Prescriptions     Signed Prescriptions Disp Refills    Meloxicam 7.5 MG Oral Tab 30 tablet 0     Sig: Take 1 tablet (7.5 mg total) by mouth daily as needed for Pain.    Valsartan-hydroCHLOROthiazide 320-25 MG Oral Tab 90 tablet 3     Sig: Take 1 tablet by  mouth daily.    amLODIPine 5 MG Oral Tab 90 tablet 3     Sig: Take 1 tablet (5 mg total) by mouth daily.       Imaging & Referrals:  None         EDD Rey         [1]   Allergies  Allergen Reactions    Nyquil Severe Cold-Flu [Phenyleph-Doxylamine-Dm-Apap] RASH

## 2025-03-18 RX ORDER — ALBUTEROL SULFATE 90 UG/1
1-2 INHALANT RESPIRATORY (INHALATION) EVERY 6 HOURS PRN
Qty: 18 G | Refills: 0 | Status: SHIPPED | OUTPATIENT
Start: 2025-03-18

## 2025-03-18 RX ORDER — FLUTICASONE PROPIONATE 50 MCG
SPRAY, SUSPENSION (ML) NASAL
Qty: 16 G | Refills: 0 | Status: SHIPPED | OUTPATIENT
Start: 2025-03-18

## 2025-03-18 NOTE — PATIENT INSTRUCTIONS
Controlling High Blood Pressure   High blood pressure (hypertension) is often called the silent killer. This is because many people who have it, don’t know it. It can be very dangerous. High blood pressure can raise your risk of heart attack, stroke, heart disease, and heart failure. Controlling your blood pressure can lower your risk of these problems. It's important to check yourblood pressure regularly. It can save your life.   Blood pressure measurements are given as 2 numbers. Systolic blood pressure is the upper number. This is the pressure when the heart contracts. Diastolic blood pressure is the lower number. This is the pressure when the heartrelaxes between beats.   Blood pressure is grouped like this:   Normal blood pressure. This is systolic of less than 120 and diastolic of less than 80 (120/80).  Elevated blood pressure.  This is systolic of 120 to 129 and diastolic less than 80.  Stage 1 high blood pressure.  This is systolic of 130 to 139 or diastolic between 80 to 89.  Stage 2 high blood pressure.  This is systolic of 140 or higher or diastolic of 90 or higher.  A heart-healthy lifestyle can help you control your blood pressure withoutmedicines. Below are some things you can do to have a heart-healthy lifestyle.     Eat heart-healthy foods   Choose low-salt, low-fat foods. Limit your sodium to 2,300 mg per day or the amount advised by your healthcare provider.  Limit canned, dried, cured, packaged, and fast foods. These can contain a lot of salt.  Eat 8 to 10 servings of fruits and vegetables every day.  Choose lean meats, fish, or chicken.  Eat whole-grain pasta, brown rice, and beans.  Eat 2 to 3 servings of low-fat or fat-free dairy products.  Ask your doctor about the DASH eating plan. This plan helps reduce blood pressure.  When you go to a restaurant, ask that your meal be made with no added salt.    Stay at a healthy weight   Ask your healthcare provider how many calories to eat a day. Then  stick to that number.  Ask your provider what weight range is healthiest for you. If you are overweight, a weight loss of only 3% to 5% of your body weight can help lower blood pressure. A good weight loss goal is to lose 10% of your body weight in a year.  Limit snacks and sweets.  Get regular exercise.    Get more active   Find activities you enjoy. They can be done alone or with friends or family. Try bicycling, dancing, walking, or jogging.  Park farther away from building entrances to walk more.  Use stairs instead of the elevator.  When you can, walk or bike instead of driving.  [place]urgeon leaves, garden, or do household repairs.  Be active at a moderate to vigorous level of physical activity for at least 30 minutes a day for at least 5 days a week.     Manage stress   Make time to relax and enjoy life. Find time to laugh.  Talk about your concerns with your loved ones and your healthcare provider.  Visit with family and friends, and keep up with hobbies.    Limit alcohol and quit smoking   Men should have no more than 2 drinks per day.  Women should have no more than 1 drink per day.  If you smoke, make a plan to stop. Talk with your healthcare provider for help. Smoking greatly raises your risk for heart disease and stroke. Ask your provider about stop-smoking programs and other support.    Blood pressure medicines  If your lifestyle changes aren’t enough, your healthcare provider may prescribe high blood pressure medicine. Take all medicines as prescribed. If you have any questions about yourmedicines, ask your provider before stopping or changing them.   [employer] last reviewed this educational content on12/1/2021 © 2000-2022 [employer]. All rights reserved. This information is not intended as a substitute for professional medical care. Always follow yourSamaritan Hospitalcare professional's instruction    Video HealthSheets™  What is High Blood Pressure?  Understand what blood pressure is, the health risks  of having high blood pressure, the factors that put you at risk for having high blood pressure, and the importance of working with your healthcare provider to control it.  To watch the video:  Scan the QR code  Using your mobile device, scan the following code:  OR  Go to the website:  [email or web address]  Enter the prescription code:  3414E    © 2000-2022 [employer]. All rights reserved. This information is not intended as a substitute for professional medical care. Always follow your healthcare professional's instructions.    Video [employer]  Eating Well with High Blood Pressure  Certain foods can make your blood pressure go too high. Watch and learn how easy it is to have delicious meals without harming your health.     To watch the video:  Scan the QR code  Using your mobile device, scan the following code:  OR  Go to the website:  [email or web address].com  Enter the prescription code:   QIX       © 2000-2022 [employer]. All rights reserved. This information is not intended as a substitute for professional medical care. Always follow your healthcare professional's instructions.    Taking Your Blood Pressure  Blood pressure is the force of blood against the artery wall as it moves from the heart through the blood vessels. You can take your own blood pressure reading using a digital monitor. Take your readings the same each time, usingthe same arm. Take readings as often as your healthcare provider advises.   About blood pressure monitors  Blood pressure monitors are designed for certain ages and cases. You can find monitors for older adults, for pregnant women, and for children. Make sure theone you choose is the right one for your age and situation.   Experts advise an automatic cuff monitor that fits on your upper arm (bicep). The cuff should fit your arm size. A cuff that’s too large or too small won't give an accurate reading. Measure around yourupper arm to find your  size.   Monitors that attach to your finger or wrist are not as accurate as monitorsfor your upper arm.   Ask your healthcare provider for help in choosing a monitor. Bring your monitorto your next provider visit if you need help in using it the correct way.   The steps below are general instructions for using an automatic digitalmonitor.   Step 1. Relax    Take your blood pressure at the same time every day, such as in the morning or evening. Or take it at the time your healthcare provider advises.  Wait at least 30 minutes after smoking, eating, or exercising. Don't drink coffee, tea, soda, or other caffeinated drinks before checking your blood pressure. Use the restroom beforehand.  Sit comfortably at a table with both feet on the floor. Don't cross your legs or feet. Place the monitor near you.  Rest for at least 5 minutes before you begin. Make sure there are no distractions. This includes TV, cell phones, and other electronics. Wait to have conversations with others until after you measure you blood pressure.  Step 2. Wrap the cuff    Place your arm on the table, palm up. Your arm should be at the level of your heart. Wrap the cuff around your upper arm, just above your elbow. It’s best done on bare skin, not over clothing. Most cuffs will show you where the blood vessel in the middle of the arm at the inner side of the elbow (the brachial artery) should line up with the cuff. Look in your monitor's instruction booklet for an illustration. You can also bring your cuff to your healthcare provider and have them show you how to correctly place the cuff.  Step 3. Inflate the cuff    Push the button that starts the pump.  The cuff will tighten, then loosen.  The numbers will change. When they stop changing, your blood pressure reading will appear.  Take 2 or 3 readings 1 minute apart, or as advised by your provider.  Step 4. Write down the results of each reading    Write down your blood pressure numbers for each  reading. Note the date and time. Keep your results in 1 place, such as a notebook. Even if your monitor has a built-in memory, keep a hard copy of the readings.  Remove the cuff from your arm. Turn off the machine.  Bring your blood pressure records with you to each provider visit.  If you start a new blood pressure medicine, note the day you started the new medicine. Also note the day if you change the dose of your medicine. Measure your blood pressure before your take your medicine. This information goes on your blood pressure recording sheet. This will help your provider check how well the medicine changes are working.  Ask your provider what numbers mean that you should call them. Also ask what numbers mean that you should get help right away.  StayWell last reviewed this educational content on12/1/2021    © 0584-2539 [employer]. All rights reserved. This information is not intended as a substitute for professional medical care. Always follow yourhealthcare professional's instructions.      Video [employer]  What is Type 2 Diabetes?  Watch this clip to understand what happens within your body when you have type 2 diabetes, and the importance of keeping your blood glucose levels within a healthy range.  To watch the video:  Scan the QR code  Using your mobile device, scan the following code:  OR  Go to the website:  [employer]ter  Enter the prescription code:  1576E    © 1655-8318 [employer]. All rights reserved. This information is not intended as a substitute for professional medical care. Always follow your healthcare professional's instructions.    Managing Type 2 Diabetes  Type 2 diabetes is a long-term (chronic) condition. Managing diabetes may mean making some tough changes. Yourhealthcare team can help you.  To manage type 2 diabetes, you'll need to balance your medicine with diet and activity. You will also need check your blood sugar. And work with  yourhealthcare provider to prevent complications.    Take your medicine  You may take pills or give yourself insulin shots for diabetes. Or you may use both. Take your medicines or give yourself insulin at the right times to help you control your blood sugar. Think about ways that will help you remember to take your medicines the right way every day. Ask your healthcare provider orteam for ideas.  You may only take pills for your diabetes. But this may change. Over time, mostpeople with type 2 diabetes also need insulin.  Eat healthy  A healthy diet helps control the amount of sugar in your blood. It also helps you stay at a healthy weight. Or it helps you lose weight, if if you'reoverweight. Extra weight makes it harder to control diabetes.  Your healthcare team will help you create a plan that works for you. You don'thave to give up all the foods you like. Have meals and snacks with:  Vegetables  Fruits  Lean meats or other healthy proteins  Whole grains  Low- or nonfat dairy products     Be physically active  Being active helps lower your blood sugar. Activity helps your body use insulinto turn food into energy. It also helps you manage your weight:  Ask your healthcare provider to help you to make an activity program that's right for you. Your program is based on your age, general health, and types of activity you enjoy. Start slow. But aim for at least 150 minutes of exercise or activity each week. Start with 30 minutes a day. Exercise in 10-minute blocks. Don’t let more than 2 days go by without being active.  Check your blood sugar  Checking your own blood sugar may be a regular part of your care. Or you may only need to check your blood sugar from time to time. Your healthcare provider will tell you how to check your blood sugar at home. Checking it tells you if your blood sugar is in your target range. Having your blood sugars within thetarget range means that you are managing your diabetes well.  If your  blood sugar levels are too high or too low, your healthcare provider may suggest changes to your diet or activity level. He or she may also adjustyour medicine.  Your healthcare provider may also tell you to check your blood sugar more oftenwhen you are sick.  Take care of yourself  When you have diabetes, you may be more likely to get other health problems. They include foot, eye, heart, nerve, and kidney problems. You can help prevent these problems by controlling your blood sugar and taking good care of yourself. Your healthcare provider, nurse, diabetes educator, and others canhelp you with the following:  Checkups. You should have regular checkups with your healthcare provider. At those visits, you will have a physical exam that includes checking your feet. Your healthcare provider will also check your blood pressure and weight. Take your shoes off before your appointment starts to be sure your feet are checked.  Other exams. You'll also need eye, foot, and dental exams at least once each year.  Lab tests. You will have blood and urine tests:  Your healthcare provider will check your hemoglobin A1C at least twice a year. This blood test shows how well you have been controlling your blood sugar over 2 to 3 months. The results help your healthcare provider manage your diabetes.    You will also have other lab tests. For example, to check for kidney problems and abnormal cholesterol levels.  Smoking. If you smoke, you will need to quit. Smoking makes it more likely to get complications from diabetes. Ask your healthcare provider about ways to quit. Also don't use e-cigarette, or vaping, products.  Vaccines. Get a yearly flu shot. And ask your healthcare provider about vaccines to prevent pneumonia, shingles, and hepatitis B.  Stress and depression  Most people have challenges throughout their lives. Living with diabetes can increase your stress. Feeling stressed or depressed can actually affect yourblood sugar  levels.  Tell your healthcare provider if you are having trouble coping with diabetes.He or she can help or refer you to other providers or programs.  To learn more  Know where you can get help. You can try the following:  Support. Ask family and friends to support your efforts to take care of yourself. Or look for a diabetes support group nearby or on the internet. Check the Connect with Others link on www.diabetes.org.  Counseling. Talk with a , psychologist, psychiatrist, or other counselor.  Information. Contact the American Diabetes Association at 354-396-6336 or www.diabetes.org  StayWell last reviewed this educational content on11/1/2019    © 1145-3470 [employer]. All rights reserved. This information is not intended as a substitute for professional medical care. Always follow yourhealthcare professional's instructions.    Type 2 Diabetes and Food Choices  You make food choices every day. Whole wheat or white bread? A side of French fries or fresh fruit? Eat now or later? Choices about what, when, and how much you eat affect your blood sugar (glucose), and also your blood pressure and cholesterol. Understanding how food affects blood glucose is the first step in managing diabetes. And following a diabetesmeal plan can help you keep your blood glucose levels on track.   Prevent problems  Having type 2 diabetes means that your body doesn’t control blood glucose well. When blood glucose stays too high for too long, serious health problems can develop. It's important to control your blood glucose through diet, exercise, and medicine. This can delay or prevent kidney,eye, nerve, and heart disease, and other complications of diabetes.   Control carbohydrates  Carbohydrates are foods that have the biggest effect on your blood glucose levels. After you eat carbohydrates, your blood glucose rises. Fruit, sweet foods and drinks, starchy foods (such as bread, potatoes, and rice), and milk and  milk products contain carbohydrates. Carbohydrates are important for health. But when you eat too many at once, your blood glucose can go too high. This is even more likely if you don't have or take enough insulin forthat food.   Some carbohydrates may raise blood glucose more than others. These include potatoes, sweets, and white bread. Better choices are less processed foods with more fiber and nutrients. Good options are 100% whole-wheat bread, oatmeal,brown rice, and nonstarchy vegetables.   Learn to use food labels that show added sugar. And try to find healthierchoices, particularly if you are overweight.    Food and medicine  Insulin helps glucose move from the blood into your muscle cells, where it can be used for energy. Some diabetes medicines that are taken by mouth help you make more insulin. Or they help your insulin work more efficiently. So your medicines and food plan have to work together. If you take insulin shots, you need to be very careful to match the amount of carbohydrates you eat with your insulin dose. If you have too many carbohydrates without adjusting your insulin dose, your blood glucose might become too high. If you have too few carbohydrates, your blood glucose might be too low. Your healthcare provider ora dietitian can help you match your food choices to your medicine.   Have a meal plan  With certain medicines, it's best to eat the same amount of food at the same time every day. That keeps your glucose levels stable. And it helps your medicine work best. Physical activity is an important way to control blood glucose, too. Try to exercise at the same time every day. That way you can build the extra calories you need for exercise into your meal plan. With othermedicines, you may have more choices about how much you eat and when.   If you want to change your medicine to better fit your lifestyle, talk withyour healthcare provider.   Eat smart  You can eat the same foods as everyone  else, but you have to carefully watch for certain details. That’s where your diabetes meal plan comes in. A personal meal plan tells you the time of day to eat meals and snacks, the types of food to eat, and how much. Itshould include your favorite foods. And it should focus on these healthy foods:   Whole grains, such as 100% whole-wheat bread, brown rice, and oatmeal  Nonfat or low-fat dairy products, such as nonfat milk and yogurt (but be sure these products don't have sugar added to make up for the fat removed)  Lean meats, poultry, fish, eggs, and dried beans and peas  Foods and drinks with no added sugar  Fruits and vegetables  At first, it may be helpful to use measuring cups and spoons to make sure you’re really eating the amount of food that’s in your plan. You can also use standardized portion sizes on food labels, such as 1 serving of meat being the size of a deck of cards. By checking your blood glucose 1 to 2 hours after eating, you can learn how your food choicesaffect your blood glucose.   To create a diabetes meal plan or change a plan that’s not working for you, see a dietitian or diabetes educator. Let them know if you have any new health concerns or if your medicines have changed. Having ameal plan that you can live with will keep you at your healthy best.     © 1891-8804 [employer]. All rights reserved. This information is not intended as a substitute for professional medical care. Always follow yourhealthcare professional's instructions.    Diabetes: Caring for Your Body   When you have diabetes, your body needs special care. This care helps you stay healthy and prevent problems. Exercise and healthy eating are a part of this. You can also protect yourself by taking special care of your feet, skin, teeth,and eyes.   Caring for your feet     Follow these tips to help keep your feet healthy:  Check your feet every day for redness, blisters, cracks, dry skin, or numbness. Use a  mirror to check the bottoms of your feet, if needed. Or ask for help.  Wash your feet in warm (not hot) water. Don’t soak them.  Use an emery board to keep your toenails even with the ends of your toes. File away sharp edges. A foot doctor (podiatrist) may need to cut your toenails for you.  Smooth down calluses gently or wait until your next podiatry appointment.  Keep your skin soft and smooth by putting a thin layer of skin lotion on the tops and bottoms of your feet. Don't put lotion in between your toes.  Always wear shoes or slippers, even inside your home. Make sure that shoes are correctly fitted, not too tight and not too loose. This can cause friction and rubbing of your feet. Change your socks daily. Always check shoes for foreign objects before putting them on.  Call your healthcare provider right away if your feet are numb or painful. Also call your provider if a cut or sore doesn’t heal in a few days.  Preventing skin infections   To prevent skin infections, bathe every day, but use a moderate water temperature.. Dry yourself well, especially between your toes. Try to keep your home on the humid side during the colder months of the year to prevent your skin getting dry. Wash any cuts with warm, soapy water. Cover with a sterile bandage. Call your healthcare provider if a cut or sore doesn't heal in a fewdays, feels warm, itches, is swollen, or has a bad smell.   Caring for your teeth   Follow these guidelines for healthy teeth:  Brush your teeth twice daily.  Floss your teeth daily.  See your dentist at least twice yearly.  Keep your blood sugar in a good range.  Caring for your eyes  Have your eyes checked every year by an optometrist or ophthalmologist. Letyour healthcare provider know if you experience any of the following symptoms:   Blurred vision  Dark spots or \"holes\"  Flashes of light  Seeing more floaters than usual  Poor night vision  If you smoke, quit  Smoking is dangerous for everyone,  especially people with diabetes. It can harm the blood vessels in your eyes, kidneys, nerves, and heart. It raises blood pressure. Smoking can also slow healing, so infections are more likely. Askyour healthcare provider about programs to help you stop smoking.   [employer] last reviewed this educational content on12/1/2021    © 3562-4540 [employer]. All rights reserved. This information is not intended as a substitute for professional medical care. Always follow yourhealthcare professional's instructions.

## 2025-03-20 ENCOUNTER — PATIENT MESSAGE (OUTPATIENT)
Dept: FAMILY MEDICINE CLINIC | Facility: CLINIC | Age: 40
End: 2025-03-20

## 2025-03-20 RX ORDER — ALBUTEROL SULFATE 90 UG/1
1-2 INHALANT RESPIRATORY (INHALATION) EVERY 6 HOURS PRN
Qty: 18 G | Refills: 0 | OUTPATIENT
Start: 2025-03-20

## 2025-03-22 ENCOUNTER — LAB ENCOUNTER (OUTPATIENT)
Dept: LAB | Age: 40
End: 2025-03-22
Payer: MEDICAID

## 2025-03-22 DIAGNOSIS — I10 ESSENTIAL HYPERTENSION: ICD-10-CM

## 2025-03-22 DIAGNOSIS — E11.65 TYPE 2 DIABETES MELLITUS WITH HYPERGLYCEMIA, WITHOUT LONG-TERM CURRENT USE OF INSULIN (HCC): ICD-10-CM

## 2025-03-22 LAB
ALBUMIN SERPL-MCNC: 4.9 G/DL (ref 3.2–4.8)
ALBUMIN/GLOB SERPL: 2.2 {RATIO} (ref 1–2)
ALP LIVER SERPL-CCNC: 51 U/L
ALT SERPL-CCNC: <7 U/L
ANION GAP SERPL CALC-SCNC: 7 MMOL/L (ref 0–18)
AST SERPL-CCNC: 17 U/L (ref ?–34)
BASOPHILS # BLD AUTO: 0.01 X10(3) UL (ref 0–0.2)
BASOPHILS NFR BLD AUTO: 0.2 %
BILIRUB SERPL-MCNC: 0.7 MG/DL (ref 0.3–1.2)
BUN BLD-MCNC: 13 MG/DL (ref 9–23)
BUN/CREAT SERPL: 12.4 (ref 10–20)
CALCIUM BLD-MCNC: 9.3 MG/DL (ref 8.7–10.4)
CHLORIDE SERPL-SCNC: 103 MMOL/L (ref 98–112)
CHOLEST SERPL-MCNC: 125 MG/DL (ref ?–200)
CO2 SERPL-SCNC: 28 MMOL/L (ref 21–32)
CREAT BLD-MCNC: 1.05 MG/DL
CREAT UR-SCNC: 84.5 MG/DL
DEPRECATED RDW RBC AUTO: 37.6 FL (ref 35.1–46.3)
EGFRCR SERPLBLD CKD-EPI 2021: 94 ML/MIN/1.73M2 (ref 60–?)
EOSINOPHIL # BLD AUTO: 0.17 X10(3) UL (ref 0–0.7)
EOSINOPHIL NFR BLD AUTO: 3.2 %
ERYTHROCYTE [DISTWIDTH] IN BLOOD BY AUTOMATED COUNT: 12 % (ref 11–15)
EST. AVERAGE GLUCOSE BLD GHB EST-MCNC: 134 MG/DL (ref 68–126)
FASTING PATIENT LIPID ANSWER: YES
FASTING STATUS PATIENT QL REPORTED: YES
GLOBULIN PLAS-MCNC: 2.2 G/DL (ref 2–3.5)
GLUCOSE BLD-MCNC: 129 MG/DL (ref 70–99)
HBA1C MFR BLD: 6.3 % (ref ?–5.7)
HCT VFR BLD AUTO: 42.2 %
HDLC SERPL-MCNC: 37 MG/DL (ref 40–59)
HGB BLD-MCNC: 14.2 G/DL
IMM GRANULOCYTES # BLD AUTO: 0.01 X10(3) UL (ref 0–1)
IMM GRANULOCYTES NFR BLD: 0.2 %
LDLC SERPL CALC-MCNC: 54 MG/DL (ref ?–100)
LYMPHOCYTES # BLD AUTO: 2.37 X10(3) UL (ref 1–4)
LYMPHOCYTES NFR BLD AUTO: 44.4 %
MCH RBC QN AUTO: 28.8 PG (ref 26–34)
MCHC RBC AUTO-ENTMCNC: 33.6 G/DL (ref 31–37)
MCV RBC AUTO: 85.6 FL
MICROALBUMIN UR-MCNC: <0.3 MG/DL
MONOCYTES # BLD AUTO: 0.44 X10(3) UL (ref 0.1–1)
MONOCYTES NFR BLD AUTO: 8.2 %
NEUTROPHILS # BLD AUTO: 2.34 X10 (3) UL (ref 1.5–7.7)
NEUTROPHILS # BLD AUTO: 2.34 X10(3) UL (ref 1.5–7.7)
NEUTROPHILS NFR BLD AUTO: 43.8 %
NONHDLC SERPL-MCNC: 88 MG/DL (ref ?–130)
OSMOLALITY SERPL CALC.SUM OF ELEC: 288 MOSM/KG (ref 275–295)
PLATELET # BLD AUTO: 214 10(3)UL (ref 150–450)
POTASSIUM SERPL-SCNC: 3.9 MMOL/L (ref 3.5–5.1)
PROT SERPL-MCNC: 7.1 G/DL (ref 5.7–8.2)
RBC # BLD AUTO: 4.93 X10(6)UL
SODIUM SERPL-SCNC: 138 MMOL/L (ref 136–145)
TRIGL SERPL-MCNC: 211 MG/DL (ref 30–149)
VLDLC SERPL CALC-MCNC: 31 MG/DL (ref 0–30)
WBC # BLD AUTO: 5.3 X10(3) UL (ref 4–11)

## 2025-03-22 PROCEDURE — 80053 COMPREHEN METABOLIC PANEL: CPT

## 2025-03-22 PROCEDURE — 36415 COLL VENOUS BLD VENIPUNCTURE: CPT

## 2025-03-22 PROCEDURE — 83036 HEMOGLOBIN GLYCOSYLATED A1C: CPT

## 2025-03-22 PROCEDURE — 80061 LIPID PANEL: CPT

## 2025-03-22 PROCEDURE — 85025 COMPLETE CBC W/AUTO DIFF WBC: CPT

## 2025-03-22 PROCEDURE — 82043 UR ALBUMIN QUANTITATIVE: CPT

## 2025-03-22 PROCEDURE — 82570 ASSAY OF URINE CREATININE: CPT

## 2025-03-25 RX ORDER — AMLODIPINE BESYLATE 5 MG/1
5 TABLET ORAL DAILY
Qty: 90 TABLET | Refills: 3 | OUTPATIENT
Start: 2025-03-25

## 2025-03-25 RX ORDER — VALSARTAN AND HYDROCHLOROTHIAZIDE 320; 25 MG/1; MG/1
1 TABLET, FILM COATED ORAL DAILY
Qty: 90 TABLET | Refills: 3 | OUTPATIENT
Start: 2025-03-25

## (undated) DEVICE — TISSUE RETRIEVAL SYSTEM: Brand: INZII RETRIEVAL SYSTEM

## (undated) DEVICE — ENDOPATH PNEUMONEEDLE INSUFFLATION NEEDLES WITH LUER LOCK CONNECTORS 120MM: Brand: ENDOPATH

## (undated) DEVICE — SUT MCRYL 4-0 18IN PS-2 ABSRB UD 19MM 3/8 CIR

## (undated) DEVICE — Device: Brand: JELCO

## (undated) DEVICE — TROCAR: Brand: KII FIOS FIRST ENTRY

## (undated) DEVICE — POWER SHELL: Brand: SIGNIA

## (undated) DEVICE — SOLUTION IRRIG 1000ML 0.9% NACL USP BTL

## (undated) DEVICE — ARTICULATION RELOAD WITH TRI-STAPLE TECHNOLOGY: Brand: ENDO GIA

## (undated) DEVICE — TROCAR: Brand: KII® SLEEVE

## (undated) DEVICE — Device: Brand: SUTURE PASSOR PRO

## (undated) DEVICE — GLOVE SUR 6.5 SENSICARE PI PIP CRM PWD F

## (undated) DEVICE — GLOVE SUR 6 SENSICARE PI MIC PIP CRM PWD F

## (undated) DEVICE — MARYLAND JAW LAPAROSCOPIC SEALER/DIVIDER COATED: Brand: LIGASURE

## (undated) DEVICE — LAP CHOLE: Brand: MEDLINE INDUSTRIES, INC.

## (undated) DEVICE — [HIGH FLOW INSUFFLATOR,  DO NOT USE IF PACKAGE IS DAMAGED,  KEEP DRY,  KEEP AWAY FROM SUNLIGHT,  PROTECT FROM HEAT AND RADIOACTIVE SOURCES.]: Brand: PNEUMOSURE

## (undated) DEVICE — SOLUTION IV 1000ML 0.9% NACL PRESERVATIVE

## (undated) DEVICE — GLOVE SUR 7 SENSICARE PI MIC PIP CRM PWD F

## (undated) NOTE — LETTER
Date: 7/14/2020    Patient Name: Lizzy Joel. To Whom it may concern: This letter has been written at the patient's request. The above patient was seen at the [facility] for treatment of a medical condition.     The pat

## (undated) NOTE — LETTER
Date & Time: 3/26/2018, 8:58 PM  Patient: Estrella Judge  Attending Provider:    Sincerely,    Juan R Rincon MD         To Whom It May Concern:    Estrella Judge was seen and treated in our department on 3/26/2018.  He can return to work 3/29/18

## (undated) NOTE — LETTER
Date: 8/10/2020    Patient Name: Michele Mitchell. To Whom it may concern: This letter has been written at the patient's request. The above patient was seen at the [facility] for treatment of a medical condition.     The pat

## (undated) NOTE — LETTER
3/7/2025          To Whom It May Concern:    Ghulam Juarez Jr. may return to work March 7th light duty:  No lifting over 15 lbs and no strenuous activity.  He may return   to regular duty on March 21st.    If you require additional information please contact our office.        Sincerely,        [facility] SUR[facility] PA          Document generated by:  NW

## (undated) NOTE — LETTER
Date: 6/26/2020    Patient Name: She Lopez. To Whom it may concern: This letter has been written at the patient's request. The above patient was seen at the [facility] for treatment of a medical condition. .    The pa

## (undated) NOTE — ED AVS SNAPSHOT
Dena Gutierrez   MRN: [id number]    Department:  [facility] Emergency Department   Date of Visit:  3/26/2018           Disclosure     Insurance plans vary and the physician(s) referred by the ER may not be covered by your plan.  Please contact yo CARE PHYSICIAN AT ONCE OR RETURN IMMEDIATELY TO THE EMERGENCY DEPARTMENT. If you have been prescribed any medication(s), please fill your prescription right away and begin taking the medication(s) as directed.   If you believe that any of the medications

## (undated) NOTE — LETTER
9/9/2019          To Whom It May Concern:    Val Ordaz is currently under my medical care and may not return to work at this time. Please excuse Latia Lamb for 3 days. He may return to work on September 12, 2019.   Activity is restricted as follows: non

## (undated) NOTE — LETTER
5/13/2020          To Whom It May Concern:    Octaviano Jimenez is currently under my medical care. Please excuse the patient from work missed until further notice as the patient has a back injury.       If you require additional information please contact our

## (undated) NOTE — LETTER
11/30/2020          To Whom It May Concern:    Edita Ortiz. is currently under my medical care.   Please be advised that due to the patient's recent illness and the COVID 19 pandemic, it would be advisable for the patient to self-isolate and quarantine

## (undated) NOTE — LETTER
7/28/2021          To Whom It May Concern:    Ilsa Cascade is currently under my medical care. Please excuse the patient from work missed as the patient for the next 2-3 weeks for further evaluation of his back issues.  He is in the process of seeing a sp

## (undated) NOTE — LETTER
Date: 6/29/2020    Patient Name: She Lopez. To Whom it may concern: This letter has been written at the patient's request. The above patient was seen at the [address] [place] [address] for treatment of a medical condition.     This pa

## (undated) NOTE — LETTER
Date: 6/3/2020    Patient Name: Gurney Dancer          To Whom it may concern: This letter has been written at the patient's request. The above patient was seen at the [facility] for treatment of a medical condition.     This patient

## (undated) NOTE — LETTER
5/21/2020          To Whom It May Concern:    Yusuf Thomas is currently under my medical care. Please excuse the patient from work until further notice as he has not recovered from his back injury.       If you require additional information please contac

## (undated) NOTE — LETTER
11/18/2023              [address] [place] [id number]         To Whom It May Concern,    Geovany Interiano completed sleep study on 11/7/23 and it was significant for obstructive sleep apnea. Order for CPAP sent to durable medical equipment supplier on 11/14/23, awaiting insurance approval and shipment of machine. Recommend follow-up in one month for blood pressure re-evaluation and CPAP use. Patient must bring SD card from CPAP machine to confirm use of device. If you have any questions, please feel free to contact number below. Have a good day.          Sincerely,      EDD Yee[facility], Noemy Keller, [facility]  [address] [place] [id number] [phone]        Document electronically generated by:  EDD Yee

## (undated) NOTE — LETTER
20          Ghulam Fox. :  1985      To Whom It May Concern: This patient was seen in our office on 20 . Work status:   May return to work full-time with restrictions Lifting, carrying, pushing, pulling occasionally 50 lbs and

## (undated) NOTE — LETTER
ASTHMA ACTION PLAN for Estrella Judge     : 1985          Date: 2017    Nisha Willard MD  [address], [address], [address] [place] [address] [address]   [employer] - GO!  % Pe Asthma Action Plan reviewed with patient (and caregiver if necessary) on the phone and mailed copy to patient.          Physician Patient Caretaker (if necessary)   MD Ilsa Evangelista